# Patient Record
Sex: FEMALE | Race: WHITE | NOT HISPANIC OR LATINO | Employment: UNEMPLOYED | URBAN - METROPOLITAN AREA
[De-identification: names, ages, dates, MRNs, and addresses within clinical notes are randomized per-mention and may not be internally consistent; named-entity substitution may affect disease eponyms.]

---

## 2017-06-26 ENCOUNTER — OFFICE VISIT - HEALTHEAST (OUTPATIENT)
Dept: INTERNAL MEDICINE | Facility: CLINIC | Age: 27
End: 2017-06-26

## 2017-06-26 DIAGNOSIS — B35.4 RINGWORM OF BODY: ICD-10-CM

## 2017-06-26 ASSESSMENT — MIFFLIN-ST. JEOR: SCORE: 1437.56

## 2017-10-11 ENCOUNTER — OFFICE VISIT - HEALTHEAST (OUTPATIENT)
Dept: INTERNAL MEDICINE | Facility: CLINIC | Age: 27
End: 2017-10-11

## 2017-10-11 DIAGNOSIS — Z00.00 HEALTHCARE MAINTENANCE: ICD-10-CM

## 2017-10-11 DIAGNOSIS — F90.2 ATTENTION DEFICIT HYPERACTIVITY DISORDER (ADHD), COMBINED TYPE: ICD-10-CM

## 2017-10-11 ASSESSMENT — MIFFLIN-ST. JEOR: SCORE: 1455.7

## 2017-11-15 ENCOUNTER — OFFICE VISIT - HEALTHEAST (OUTPATIENT)
Dept: INTERNAL MEDICINE | Facility: CLINIC | Age: 27
End: 2017-11-15

## 2017-11-15 DIAGNOSIS — Z00.00 HEALTH CARE MAINTENANCE: ICD-10-CM

## 2017-11-15 DIAGNOSIS — F90.2 ATTENTION DEFICIT HYPERACTIVITY DISORDER (ADHD), COMBINED TYPE: ICD-10-CM

## 2017-11-15 LAB — HIV 1+2 AB+HIV1 P24 AG SERPL QL IA: NEGATIVE

## 2017-11-15 ASSESSMENT — MIFFLIN-ST. JEOR: SCORE: 1457.29

## 2017-12-30 ENCOUNTER — COMMUNICATION - HEALTHEAST (OUTPATIENT)
Dept: INTERNAL MEDICINE | Facility: CLINIC | Age: 27
End: 2017-12-30

## 2018-03-15 ENCOUNTER — COMMUNICATION - HEALTHEAST (OUTPATIENT)
Dept: INTERNAL MEDICINE | Facility: CLINIC | Age: 28
End: 2018-03-15

## 2018-03-16 ENCOUNTER — COMMUNICATION - HEALTHEAST (OUTPATIENT)
Dept: INTERNAL MEDICINE | Facility: CLINIC | Age: 28
End: 2018-03-16

## 2018-03-20 ENCOUNTER — COMMUNICATION - HEALTHEAST (OUTPATIENT)
Dept: INTERNAL MEDICINE | Facility: CLINIC | Age: 28
End: 2018-03-20

## 2018-05-28 ENCOUNTER — COMMUNICATION - HEALTHEAST (OUTPATIENT)
Dept: INTERNAL MEDICINE | Facility: CLINIC | Age: 28
End: 2018-05-28

## 2018-05-28 DIAGNOSIS — F90.2 ATTENTION DEFICIT HYPERACTIVITY DISORDER (ADHD), COMBINED TYPE: ICD-10-CM

## 2018-05-31 ENCOUNTER — COMMUNICATION - HEALTHEAST (OUTPATIENT)
Dept: INTERNAL MEDICINE | Facility: CLINIC | Age: 28
End: 2018-05-31

## 2018-07-26 ENCOUNTER — COMMUNICATION - HEALTHEAST (OUTPATIENT)
Dept: INTERNAL MEDICINE | Facility: CLINIC | Age: 28
End: 2018-07-26

## 2018-07-27 ENCOUNTER — RECORDS - HEALTHEAST (OUTPATIENT)
Dept: ADMINISTRATIVE | Facility: OTHER | Age: 28
End: 2018-07-27

## 2018-09-05 ENCOUNTER — COMMUNICATION - HEALTHEAST (OUTPATIENT)
Dept: INTERNAL MEDICINE | Facility: CLINIC | Age: 28
End: 2018-09-05

## 2018-09-25 ENCOUNTER — COMMUNICATION - HEALTHEAST (OUTPATIENT)
Dept: INTERNAL MEDICINE | Facility: CLINIC | Age: 28
End: 2018-09-25

## 2018-10-02 ENCOUNTER — OFFICE VISIT - HEALTHEAST (OUTPATIENT)
Dept: INTERNAL MEDICINE | Facility: CLINIC | Age: 28
End: 2018-10-02

## 2018-10-02 DIAGNOSIS — F90.2 ATTENTION DEFICIT HYPERACTIVITY DISORDER (ADHD), COMBINED TYPE: ICD-10-CM

## 2018-10-02 DIAGNOSIS — Z00.00 HEALTH CARE MAINTENANCE: ICD-10-CM

## 2018-10-02 DIAGNOSIS — R35.0 URINARY FREQUENCY: ICD-10-CM

## 2018-10-02 DIAGNOSIS — Z76.89 ENCOUNTER TO ESTABLISH CARE WITH NEW DOCTOR: ICD-10-CM

## 2018-10-02 DIAGNOSIS — N94.89 VAGINAL BURNING: ICD-10-CM

## 2018-10-02 LAB
ALBUMIN UR-MCNC: NEGATIVE MG/DL
APPEARANCE UR: CLEAR
BILIRUB UR QL STRIP: NEGATIVE
CLUE CELLS: NORMAL
COLOR UR AUTO: YELLOW
GLUCOSE UR STRIP-MCNC: NEGATIVE MG/DL
HGB UR QL STRIP: NEGATIVE
KETONES UR STRIP-MCNC: ABNORMAL MG/DL
LEUKOCYTE ESTERASE UR QL STRIP: NEGATIVE
NITRATE UR QL: NEGATIVE
PH UR STRIP: 6.5 [PH] (ref 5–8)
SP GR UR STRIP: 1.02 (ref 1–1.03)
TRICHOMONAS, WET PREP: NORMAL
UROBILINOGEN UR STRIP-ACNC: ABNORMAL
YEAST, WET PREP: NORMAL

## 2018-10-02 ASSESSMENT — MIFFLIN-ST. JEOR: SCORE: 1439.83

## 2018-11-14 ENCOUNTER — COMMUNICATION - HEALTHEAST (OUTPATIENT)
Dept: INTERNAL MEDICINE | Facility: CLINIC | Age: 28
End: 2018-11-14

## 2018-11-15 ENCOUNTER — AMBULATORY - HEALTHEAST (OUTPATIENT)
Dept: INTERNAL MEDICINE | Facility: CLINIC | Age: 28
End: 2018-11-15

## 2019-02-13 ENCOUNTER — COMMUNICATION - HEALTHEAST (OUTPATIENT)
Dept: INTERNAL MEDICINE | Facility: CLINIC | Age: 29
End: 2019-02-13

## 2019-04-18 ENCOUNTER — COMMUNICATION - HEALTHEAST (OUTPATIENT)
Dept: INTERNAL MEDICINE | Facility: CLINIC | Age: 29
End: 2019-04-18

## 2019-04-18 DIAGNOSIS — F90.2 ATTENTION DEFICIT HYPERACTIVITY DISORDER (ADHD), COMBINED TYPE: ICD-10-CM

## 2019-04-19 ENCOUNTER — COMMUNICATION - HEALTHEAST (OUTPATIENT)
Dept: INTERNAL MEDICINE | Facility: CLINIC | Age: 29
End: 2019-04-19

## 2019-06-02 ENCOUNTER — COMMUNICATION - HEALTHEAST (OUTPATIENT)
Dept: INTERNAL MEDICINE | Facility: CLINIC | Age: 29
End: 2019-06-02

## 2019-06-02 DIAGNOSIS — F90.2 ATTENTION DEFICIT HYPERACTIVITY DISORDER (ADHD), COMBINED TYPE: ICD-10-CM

## 2019-07-19 ENCOUNTER — COMMUNICATION - HEALTHEAST (OUTPATIENT)
Dept: INTERNAL MEDICINE | Facility: CLINIC | Age: 29
End: 2019-07-19

## 2019-07-19 DIAGNOSIS — F90.2 ATTENTION DEFICIT HYPERACTIVITY DISORDER (ADHD), COMBINED TYPE: ICD-10-CM

## 2019-07-31 ENCOUNTER — COMMUNICATION - HEALTHEAST (OUTPATIENT)
Dept: INTERNAL MEDICINE | Facility: CLINIC | Age: 29
End: 2019-07-31

## 2019-07-31 DIAGNOSIS — F90.2 ATTENTION DEFICIT HYPERACTIVITY DISORDER (ADHD), COMBINED TYPE: ICD-10-CM

## 2019-09-20 ENCOUNTER — OFFICE VISIT - HEALTHEAST (OUTPATIENT)
Dept: INTERNAL MEDICINE | Facility: CLINIC | Age: 29
End: 2019-09-20

## 2019-09-20 DIAGNOSIS — F90.2 ATTENTION DEFICIT HYPERACTIVITY DISORDER (ADHD), COMBINED TYPE: ICD-10-CM

## 2019-09-20 DIAGNOSIS — Z00.00 HEALTH CARE MAINTENANCE: ICD-10-CM

## 2019-09-20 DIAGNOSIS — N89.8 VAGINAL DISCHARGE: ICD-10-CM

## 2019-09-20 LAB
ANION GAP SERPL CALCULATED.3IONS-SCNC: 10 MMOL/L (ref 5–18)
BUN SERPL-MCNC: 18 MG/DL (ref 8–22)
CALCIUM SERPL-MCNC: 9.4 MG/DL (ref 8.5–10.5)
CHLORIDE BLD-SCNC: 106 MMOL/L (ref 98–107)
CHOLEST SERPL-MCNC: 166 MG/DL
CLUE CELLS: NORMAL
CO2 SERPL-SCNC: 24 MMOL/L (ref 22–31)
CREAT SERPL-MCNC: 0.8 MG/DL (ref 0.6–1.1)
ERYTHROCYTE [DISTWIDTH] IN BLOOD BY AUTOMATED COUNT: 10.1 % (ref 11–14.5)
FASTING STATUS PATIENT QL REPORTED: NO
GFR SERPL CREATININE-BSD FRML MDRD: >60 ML/MIN/1.73M2
GLUCOSE BLD-MCNC: 78 MG/DL (ref 70–125)
HCT VFR BLD AUTO: 40.2 % (ref 35–47)
HDLC SERPL-MCNC: 62 MG/DL
HGB BLD-MCNC: 13.6 G/DL (ref 12–16)
LDLC SERPL CALC-MCNC: 73 MG/DL
MCH RBC QN AUTO: 32.1 PG (ref 27–34)
MCHC RBC AUTO-ENTMCNC: 33.9 G/DL (ref 32–36)
MCV RBC AUTO: 95 FL (ref 80–100)
PLATELET # BLD AUTO: 235 THOU/UL (ref 140–440)
PMV BLD AUTO: 6.7 FL (ref 7–10)
POTASSIUM BLD-SCNC: 4 MMOL/L (ref 3.5–5)
RBC # BLD AUTO: 4.25 MILL/UL (ref 3.8–5.4)
SODIUM SERPL-SCNC: 140 MMOL/L (ref 136–145)
TRICHOMONAS, WET PREP: NORMAL
TRIGL SERPL-MCNC: 155 MG/DL
TSH SERPL DL<=0.005 MIU/L-ACNC: 4.23 UIU/ML (ref 0.3–5)
WBC: 6.4 THOU/UL (ref 4–11)
YEAST, WET PREP: NORMAL

## 2019-09-20 ASSESSMENT — MIFFLIN-ST. JEOR: SCORE: 1512.4

## 2019-09-20 ASSESSMENT — PATIENT HEALTH QUESTIONNAIRE - PHQ9: SUM OF ALL RESPONSES TO PHQ QUESTIONS 1-9: 19

## 2019-09-22 ENCOUNTER — COMMUNICATION - HEALTHEAST (OUTPATIENT)
Dept: INTERNAL MEDICINE | Facility: CLINIC | Age: 29
End: 2019-09-22

## 2019-09-23 ENCOUNTER — AMBULATORY - HEALTHEAST (OUTPATIENT)
Dept: INTERNAL MEDICINE | Facility: CLINIC | Age: 29
End: 2019-09-23

## 2019-09-23 DIAGNOSIS — Z83.49 FAMILY HISTORY OF MTHFR DEFICIENCY: ICD-10-CM

## 2019-11-05 ENCOUNTER — COMMUNICATION - HEALTHEAST (OUTPATIENT)
Dept: INTERNAL MEDICINE | Facility: CLINIC | Age: 29
End: 2019-11-05

## 2019-11-05 DIAGNOSIS — F90.2 ATTENTION DEFICIT HYPERACTIVITY DISORDER (ADHD), COMBINED TYPE: ICD-10-CM

## 2019-12-18 ENCOUNTER — COMMUNICATION - HEALTHEAST (OUTPATIENT)
Dept: INTERNAL MEDICINE | Facility: CLINIC | Age: 29
End: 2019-12-18

## 2019-12-18 DIAGNOSIS — F90.2 ATTENTION DEFICIT HYPERACTIVITY DISORDER (ADHD), COMBINED TYPE: ICD-10-CM

## 2020-02-26 ENCOUNTER — COMMUNICATION - HEALTHEAST (OUTPATIENT)
Dept: INTERNAL MEDICINE | Facility: CLINIC | Age: 30
End: 2020-02-26

## 2020-02-26 DIAGNOSIS — F90.2 ATTENTION DEFICIT HYPERACTIVITY DISORDER (ADHD), COMBINED TYPE: ICD-10-CM

## 2020-04-15 ENCOUNTER — COMMUNICATION - HEALTHEAST (OUTPATIENT)
Dept: INTERNAL MEDICINE | Facility: CLINIC | Age: 30
End: 2020-04-15

## 2020-04-15 DIAGNOSIS — F90.2 ATTENTION DEFICIT HYPERACTIVITY DISORDER (ADHD), COMBINED TYPE: ICD-10-CM

## 2020-05-20 ENCOUNTER — COMMUNICATION - HEALTHEAST (OUTPATIENT)
Dept: INTERNAL MEDICINE | Facility: CLINIC | Age: 30
End: 2020-05-20

## 2020-05-21 ENCOUNTER — COMMUNICATION - HEALTHEAST (OUTPATIENT)
Dept: INTERNAL MEDICINE | Facility: CLINIC | Age: 30
End: 2020-05-21

## 2020-05-21 ENCOUNTER — OFFICE VISIT - HEALTHEAST (OUTPATIENT)
Dept: INTERNAL MEDICINE | Facility: CLINIC | Age: 30
End: 2020-05-21

## 2020-05-21 DIAGNOSIS — F41.0 PANIC ATTACK: ICD-10-CM

## 2020-05-21 DIAGNOSIS — F41.1 GENERALIZED ANXIETY DISORDER: ICD-10-CM

## 2020-05-21 DIAGNOSIS — F90.2 ATTENTION DEFICIT HYPERACTIVITY DISORDER (ADHD), COMBINED TYPE: ICD-10-CM

## 2020-05-21 RX ORDER — FEXOFENADINE HCL 180 MG/1
180 TABLET ORAL DAILY
Status: SHIPPED | COMMUNITY
Start: 2020-05-21

## 2020-05-29 ENCOUNTER — COMMUNICATION - HEALTHEAST (OUTPATIENT)
Dept: INTERNAL MEDICINE | Facility: CLINIC | Age: 30
End: 2020-05-29

## 2020-05-29 DIAGNOSIS — F90.2 ATTENTION DEFICIT HYPERACTIVITY DISORDER (ADHD), COMBINED TYPE: ICD-10-CM

## 2020-05-30 ENCOUNTER — COMMUNICATION - HEALTHEAST (OUTPATIENT)
Dept: INTERNAL MEDICINE | Facility: CLINIC | Age: 30
End: 2020-05-30

## 2020-05-30 DIAGNOSIS — F90.2 ATTENTION DEFICIT HYPERACTIVITY DISORDER (ADHD), COMBINED TYPE: ICD-10-CM

## 2020-07-28 ENCOUNTER — COMMUNICATION - HEALTHEAST (OUTPATIENT)
Dept: INTERNAL MEDICINE | Facility: CLINIC | Age: 30
End: 2020-07-28

## 2020-07-28 DIAGNOSIS — F90.2 ATTENTION DEFICIT HYPERACTIVITY DISORDER (ADHD), COMBINED TYPE: ICD-10-CM

## 2020-08-21 ENCOUNTER — COMMUNICATION - HEALTHEAST (OUTPATIENT)
Dept: INTERNAL MEDICINE | Facility: CLINIC | Age: 30
End: 2020-08-21

## 2020-08-21 DIAGNOSIS — F90.2 ATTENTION DEFICIT HYPERACTIVITY DISORDER (ADHD), COMBINED TYPE: ICD-10-CM

## 2020-10-05 ENCOUNTER — COMMUNICATION - HEALTHEAST (OUTPATIENT)
Dept: INTERNAL MEDICINE | Facility: CLINIC | Age: 30
End: 2020-10-05

## 2020-10-05 DIAGNOSIS — F90.2 ATTENTION DEFICIT HYPERACTIVITY DISORDER (ADHD), COMBINED TYPE: ICD-10-CM

## 2020-10-06 ENCOUNTER — COMMUNICATION - HEALTHEAST (OUTPATIENT)
Dept: INTERNAL MEDICINE | Facility: CLINIC | Age: 30
End: 2020-10-06

## 2020-10-06 DIAGNOSIS — F90.2 ATTENTION DEFICIT HYPERACTIVITY DISORDER (ADHD), COMBINED TYPE: ICD-10-CM

## 2020-11-04 ENCOUNTER — COMMUNICATION - HEALTHEAST (OUTPATIENT)
Dept: INTERNAL MEDICINE | Facility: CLINIC | Age: 30
End: 2020-11-04

## 2020-11-04 DIAGNOSIS — F90.2 ATTENTION DEFICIT HYPERACTIVITY DISORDER (ADHD), COMBINED TYPE: ICD-10-CM

## 2020-12-15 ENCOUNTER — COMMUNICATION - HEALTHEAST (OUTPATIENT)
Dept: INTERNAL MEDICINE | Facility: CLINIC | Age: 30
End: 2020-12-15

## 2020-12-15 DIAGNOSIS — F90.2 ATTENTION DEFICIT HYPERACTIVITY DISORDER (ADHD), COMBINED TYPE: ICD-10-CM

## 2020-12-16 ENCOUNTER — COMMUNICATION - HEALTHEAST (OUTPATIENT)
Dept: INTERNAL MEDICINE | Facility: CLINIC | Age: 30
End: 2020-12-16

## 2020-12-16 DIAGNOSIS — F41.0 PANIC ATTACK: ICD-10-CM

## 2020-12-16 DIAGNOSIS — F41.1 GENERALIZED ANXIETY DISORDER: ICD-10-CM

## 2020-12-16 RX ORDER — CITALOPRAM HYDROBROMIDE 20 MG/1
20 TABLET ORAL DAILY
Qty: 90 TABLET | Refills: 3 | Status: SHIPPED | OUTPATIENT
Start: 2020-12-16 | End: 2022-02-11

## 2020-12-18 ENCOUNTER — COMMUNICATION - HEALTHEAST (OUTPATIENT)
Dept: INTERNAL MEDICINE | Facility: CLINIC | Age: 30
End: 2020-12-18

## 2020-12-18 DIAGNOSIS — F90.2 ATTENTION DEFICIT HYPERACTIVITY DISORDER (ADHD), COMBINED TYPE: ICD-10-CM

## 2021-01-12 ENCOUNTER — OFFICE VISIT - HEALTHEAST (OUTPATIENT)
Dept: INTERNAL MEDICINE | Facility: CLINIC | Age: 31
End: 2021-01-12

## 2021-01-12 DIAGNOSIS — Z83.49 FAMILY HISTORY OF MTHFR DEFICIENCY: ICD-10-CM

## 2021-01-12 DIAGNOSIS — Z00.00 ENCOUNTER FOR PREVENTIVE CARE: ICD-10-CM

## 2021-01-12 DIAGNOSIS — F90.2 ATTENTION DEFICIT HYPERACTIVITY DISORDER (ADHD), COMBINED TYPE: ICD-10-CM

## 2021-01-12 DIAGNOSIS — Z78.9 ALCOHOL USE: ICD-10-CM

## 2021-01-12 DIAGNOSIS — R63.5 WEIGHT GAIN: ICD-10-CM

## 2021-01-12 DIAGNOSIS — F41.1 GENERALIZED ANXIETY DISORDER: ICD-10-CM

## 2021-01-12 DIAGNOSIS — R30.0 DYSURIA: ICD-10-CM

## 2021-01-12 LAB
ALBUMIN SERPL-MCNC: 4.3 G/DL (ref 3.5–5)
ALBUMIN UR-MCNC: NEGATIVE MG/DL
ALP SERPL-CCNC: 67 U/L (ref 45–120)
ALT SERPL W P-5'-P-CCNC: 14 U/L (ref 0–45)
ANION GAP SERPL CALCULATED.3IONS-SCNC: 9 MMOL/L (ref 5–18)
APPEARANCE UR: CLEAR
AST SERPL W P-5'-P-CCNC: 15 U/L (ref 0–40)
BACTERIA #/AREA URNS HPF: ABNORMAL HPF
BILIRUB DIRECT SERPL-MCNC: 0.2 MG/DL
BILIRUB SERPL-MCNC: 0.6 MG/DL (ref 0–1)
BILIRUB UR QL STRIP: NEGATIVE
BUN SERPL-MCNC: 15 MG/DL (ref 8–22)
CALCIUM SERPL-MCNC: 9.7 MG/DL (ref 8.5–10.5)
CHLORIDE BLD-SCNC: 104 MMOL/L (ref 98–107)
CHOLEST SERPL-MCNC: 179 MG/DL
CO2 SERPL-SCNC: 25 MMOL/L (ref 22–31)
COLOR UR AUTO: YELLOW
CREAT SERPL-MCNC: 0.8 MG/DL (ref 0.6–1.1)
ERYTHROCYTE [DISTWIDTH] IN BLOOD BY AUTOMATED COUNT: 10.5 % (ref 11–14.5)
FASTING STATUS PATIENT QL REPORTED: YES
GFR SERPL CREATININE-BSD FRML MDRD: >60 ML/MIN/1.73M2
GLUCOSE BLD-MCNC: 94 MG/DL (ref 70–125)
GLUCOSE UR STRIP-MCNC: NEGATIVE MG/DL
HCT VFR BLD AUTO: 38.6 % (ref 35–47)
HDLC SERPL-MCNC: 48 MG/DL
HGB BLD-MCNC: 13.6 G/DL (ref 12–16)
HGB UR QL STRIP: NEGATIVE
KETONES UR STRIP-MCNC: NEGATIVE MG/DL
LDLC SERPL CALC-MCNC: 121 MG/DL
LEUKOCYTE ESTERASE UR QL STRIP: ABNORMAL
MCH RBC QN AUTO: 30.1 PG (ref 27–34)
MCHC RBC AUTO-ENTMCNC: 35.3 G/DL (ref 32–36)
MCV RBC AUTO: 85 FL (ref 80–100)
NITRATE UR QL: NEGATIVE
PH UR STRIP: 5.5 [PH] (ref 5–8)
PLATELET # BLD AUTO: 222 THOU/UL (ref 140–440)
PMV BLD AUTO: 7.4 FL (ref 7–10)
POTASSIUM BLD-SCNC: 5.4 MMOL/L (ref 3.5–5)
PROT SERPL-MCNC: 7.2 G/DL (ref 6–8)
RBC # BLD AUTO: 4.52 MILL/UL (ref 3.8–5.4)
RBC #/AREA URNS AUTO: ABNORMAL HPF
SODIUM SERPL-SCNC: 138 MMOL/L (ref 136–145)
SP GR UR STRIP: >=1.03 (ref 1–1.03)
SQUAMOUS #/AREA URNS AUTO: ABNORMAL LPF
TRIGL SERPL-MCNC: 50 MG/DL
TSH SERPL DL<=0.005 MIU/L-ACNC: 2.17 UIU/ML (ref 0.3–5)
UROBILINOGEN UR STRIP-ACNC: ABNORMAL
WBC #/AREA URNS AUTO: ABNORMAL HPF
WBC CLUMPS #/AREA URNS HPF: PRESENT /[HPF]
WBC: 5.7 THOU/UL (ref 4–11)

## 2021-01-12 ASSESSMENT — MIFFLIN-ST. JEOR: SCORE: 1531.97

## 2021-01-12 ASSESSMENT — PATIENT HEALTH QUESTIONNAIRE - PHQ9: SUM OF ALL RESPONSES TO PHQ QUESTIONS 1-9: 8

## 2021-01-13 LAB
BACTERIA SPEC CULT: NO GROWTH
HPV SOURCE: ABNORMAL
HUMAN PAPILLOMA VIRUS 16 DNA: POSITIVE
HUMAN PAPILLOMA VIRUS 18 DNA: NEGATIVE
HUMAN PAPILLOMA VIRUS FINAL DIAGNOSIS: ABNORMAL
HUMAN PAPILLOMA VIRUS OTHER HR: NEGATIVE
SPECIMEN DESCRIPTION: ABNORMAL

## 2021-01-14 LAB
C TRACH DNA SPEC QL PROBE+SIG AMP: NEGATIVE
FACTOR 5 LEIDEN MUTAT PCR - HISTORICAL: NORMAL
LA PPP-IMP: NEGATIVE
N GONORRHOEA DNA SPEC QL NAA+PROBE: NEGATIVE

## 2021-01-18 LAB — MOLECULAR DX INHERIT DISORDER - HISTORICAL: NORMAL

## 2021-01-19 ENCOUNTER — COMMUNICATION - HEALTHEAST (OUTPATIENT)
Dept: INTERNAL MEDICINE | Facility: CLINIC | Age: 31
End: 2021-01-19

## 2021-01-20 LAB
BKR LAB AP ABNORMAL BLEEDING: NO
BKR LAB AP BIRTH CONTROL/HORMONES: NORMAL
BKR LAB AP CERVICAL APPEARANCE: NORMAL
BKR LAB AP GYN ADEQUACY: NORMAL
BKR LAB AP GYN INTERPRETATION: NORMAL
BKR LAB AP HPV REFLEX: NORMAL
BKR LAB AP LMP: NORMAL
BKR LAB AP PATIENT STATUS: NORMAL
BKR LAB AP PREVIOUS ABNORMAL: NO
BKR LAB AP PREVIOUS NORMAL: 2017
HIGH RISK?: NO
PATH REPORT.COMMENTS IMP SPEC: NORMAL
RESULT FLAG (HE HISTORICAL CONVERSION): NORMAL

## 2021-01-21 ENCOUNTER — COMMUNICATION - HEALTHEAST (OUTPATIENT)
Dept: INTERNAL MEDICINE | Facility: CLINIC | Age: 31
End: 2021-01-21

## 2021-01-21 ENCOUNTER — AMBULATORY - HEALTHEAST (OUTPATIENT)
Dept: INTERNAL MEDICINE | Facility: CLINIC | Age: 31
End: 2021-01-21

## 2021-01-21 ENCOUNTER — COMMUNICATION - HEALTHEAST (OUTPATIENT)
Dept: OBGYN | Facility: CLINIC | Age: 31
End: 2021-01-21

## 2021-01-21 DIAGNOSIS — R87.618 PAP SMEAR ABNORMALITY OF CERVIX/HUMAN PAPILLOMAVIRUS (HPV) POSITIVE: ICD-10-CM

## 2021-01-21 DIAGNOSIS — F90.2 ATTENTION DEFICIT HYPERACTIVITY DISORDER (ADHD), COMBINED TYPE: ICD-10-CM

## 2021-01-21 DIAGNOSIS — R87.618 OTHER ABNORMAL CYTOLOGICAL FINDING OF SPECIMEN FROM CERVIX: ICD-10-CM

## 2021-02-02 ENCOUNTER — RECORDS - HEALTHEAST (OUTPATIENT)
Dept: ADMINISTRATIVE | Facility: OTHER | Age: 31
End: 2021-02-02

## 2021-02-03 ENCOUNTER — RECORDS - HEALTHEAST (OUTPATIENT)
Dept: ADMINISTRATIVE | Facility: OTHER | Age: 31
End: 2021-02-03

## 2021-02-06 ENCOUNTER — COMMUNICATION - HEALTHEAST (OUTPATIENT)
Dept: INTERNAL MEDICINE | Facility: CLINIC | Age: 31
End: 2021-02-06

## 2021-02-06 DIAGNOSIS — Z00.00 ROUTINE HEALTH MAINTENANCE: ICD-10-CM

## 2021-02-23 ENCOUNTER — COMMUNICATION - HEALTHEAST (OUTPATIENT)
Dept: INTERNAL MEDICINE | Facility: CLINIC | Age: 31
End: 2021-02-23

## 2021-02-23 DIAGNOSIS — F90.2 ATTENTION DEFICIT HYPERACTIVITY DISORDER (ADHD), COMBINED TYPE: ICD-10-CM

## 2021-03-06 ENCOUNTER — COMMUNICATION - HEALTHEAST (OUTPATIENT)
Dept: INTERNAL MEDICINE | Facility: CLINIC | Age: 31
End: 2021-03-06

## 2021-03-06 DIAGNOSIS — R30.0 DYSURIA: ICD-10-CM

## 2021-03-10 ENCOUNTER — COMMUNICATION - HEALTHEAST (OUTPATIENT)
Dept: OBGYN | Facility: CLINIC | Age: 31
End: 2021-03-10

## 2021-03-10 DIAGNOSIS — R87.810 CERVICAL HIGH RISK HPV (HUMAN PAPILLOMAVIRUS) TEST POSITIVE: ICD-10-CM

## 2021-04-02 ENCOUNTER — COMMUNICATION - HEALTHEAST (OUTPATIENT)
Dept: INTERNAL MEDICINE | Facility: CLINIC | Age: 31
End: 2021-04-02

## 2021-04-02 DIAGNOSIS — F90.2 ATTENTION DEFICIT HYPERACTIVITY DISORDER (ADHD), COMBINED TYPE: ICD-10-CM

## 2021-04-16 ENCOUNTER — COMMUNICATION - HEALTHEAST (OUTPATIENT)
Dept: INTERNAL MEDICINE | Facility: CLINIC | Age: 31
End: 2021-04-16

## 2021-04-29 ENCOUNTER — COMMUNICATION - HEALTHEAST (OUTPATIENT)
Dept: INTERNAL MEDICINE | Facility: CLINIC | Age: 31
End: 2021-04-29

## 2021-04-29 DIAGNOSIS — F90.2 ATTENTION DEFICIT HYPERACTIVITY DISORDER (ADHD), COMBINED TYPE: ICD-10-CM

## 2021-05-26 ASSESSMENT — PATIENT HEALTH QUESTIONNAIRE - PHQ9: SUM OF ALL RESPONSES TO PHQ QUESTIONS 1-9: 19

## 2021-05-27 ASSESSMENT — PATIENT HEALTH QUESTIONNAIRE - PHQ9: SUM OF ALL RESPONSES TO PHQ QUESTIONS 1-9: 8

## 2021-05-28 NOTE — TELEPHONE ENCOUNTER
Controlled Substance Refill Request  Medication:   Requested Prescriptions     Pending Prescriptions Disp Refills     dextroamphetamine-amphetamine (ADDERALL) 10 mg Tab tablet 60 tablet 0     Sig: Take 1 tablet by mouth 2 (two) times a day.     Date Last Fill: 2/13/19  Pharmacy: [facility]   Submit electronically to pharmacy  Controlled Substance Agreement on File:   Encounter-Level CSA Scan Date:    There are no encounter-level csa scan date.       Last office visit: Last office visit pertaining to requested medication was 10/2/18.

## 2021-05-29 NOTE — TELEPHONE ENCOUNTER
Controlled Substance Refill Request  Medication:   Requested Prescriptions     Pending Prescriptions Disp Refills     dextroamphetamine-amphetamine (ADDERALL) 10 mg Tab tablet 60 tablet 0     Sig: Take 1 tablet by mouth 2 (two) times a day.     Date Last Fill: 4/19/19  Pharmacy: [facility]   Submit electronically to pharmacy    Controlled Substance Agreement on File:   Encounter-Level CSA Scan Date:    There are no encounter-level csa scan date.       Last office visit with primary: 10/2/2018

## 2021-05-30 NOTE — TELEPHONE ENCOUNTER
Controlled Substance Refill Request  Medication:   Requested Prescriptions     Pending Prescriptions Disp Refills     dextroamphetamine-amphetamine (ADDERALL) 10 mg Tab tablet 60 tablet 0     Sig: Take 1 tablet by mouth 2 (two) times a day.     Date Last Fill: 6/3/19  Pharmacy: [facility]   Submit electronically to pharmacy  Controlled Substance Agreement on File:   Encounter-Level CSA Scan Date:    There are no encounter-level csa scan date.       Last office visit: Last office visit pertaining to requested medication was 10/2/18.

## 2021-05-31 VITALS — HEIGHT: 65 IN | WEIGHT: 158 LBS | BODY MASS INDEX: 26.33 KG/M2

## 2021-05-31 VITALS — HEIGHT: 64 IN | BODY MASS INDEX: 28.25 KG/M2 | WEIGHT: 165.5 LBS

## 2021-05-31 VITALS — HEIGHT: 66 IN | BODY MASS INDEX: 25.81 KG/M2 | WEIGHT: 160.6 LBS

## 2021-05-31 NOTE — TELEPHONE ENCOUNTER
[employer] message sent to the pt to schedule a follow up   Please help schedule pt for med check up

## 2021-05-31 NOTE — TELEPHONE ENCOUNTER
Controlled Substance Refill Request  Medication:   Requested Prescriptions     Pending Prescriptions Disp Refills     dextroamphetamine-amphetamine (ADDERALL) 10 mg Tab tablet 60 tablet 0     Sig: Take 1 tablet by mouth 2 (two) times a day.     Date Last Fill: 7/19/19  Pharmacy: [facility]   Submit electronically to pharmacy  Controlled Substance Agreement on File:   Encounter-Level CSA Scan Date:    There are no encounter-level csa scan date.       Last office visit: Last office visit pertaining to requested medication was 10/2/18.

## 2021-06-01 VITALS — HEIGHT: 64 IN | WEIGHT: 162 LBS | BODY MASS INDEX: 27.66 KG/M2

## 2021-06-01 NOTE — PROGRESS NOTES
[facility] Follow Up Note    Assessment/Plan:  1. Attention deficit hyperactivity disorder (ADHD), combined type  Using medications less than regularly.  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  Using the medication more during the restaurant hours.  Starting college in spring.  Recommendation: Continue with Adderall as she is doing.  She is not using it excessively.  - dextroamphetamine-amphetamine (ADDERALL) 10 mg Tab tablet; Take 1 tablet by mouth 2 (two) times a day.  Dispense: 60 tablet; Refill: 0    2. Vaginal discharge  With IUD.  Not sexually active.  Complaints of malodorous discharge.  Recommendation: Wet prep.  MetroGel-vaginal preparation.  If no improvement, could consider removal of IUD since she has had this greater than 4 years.  - Wet Prep, Vaginal  - metroNIDAZOLE (METROGEL) 0.75 % vaginal gel; Hs for 7 days  Dispense: 70 g; Refill: 0    3.  Self-report of family history of coagulation abnormality.  Sisters are on blood thinners for pregnancy.  Recommendation: Contact me with further detailed family history.  We will update labs.    Aicha Ramon MD    Chief Complaint:  Chief Complaint   Patient presents with     Medication Management       History of Present Illness:  Layne is a 28 y.o. female who is here today for medication check.  Of note, she has been on Adderall for ADD.  She has a small dose ordered to be used as needed.  She is employed as a  in a restaurant as well as a nanny.  She does not use the medication when she is working with the children.  She does not feel it is necessary.  She is, however, starting college soon.  She is hoping to complete her bachelor's degree in communications.  She states that she functions much better and is able to concentrate with her ADD meds on board.    Additional concerns include a vaginal discharge.  She notes a yellow, malodorous discharge on a daily basis.  This is been present for several months.  Of note, she is  "not sexually active.  She does not have menstrual periods with the IUD-Mirena.  This was inserted by nurse midwife [address].  She believes it is approximately 4 years old.  She denies any pelvic pain, distention, etc.  She had a normal Pap smear a year and a half ago.    Review of Systems:  A comprehensive review of systems was performed and was otherwise negative    PFSH:  Social History: She is single and living at her parents home.  She is hoping to start college for a degree in [employer].  She has 2 years under her belt.  Additionally, she works as a  and a nanny.  She is not sexually active.  Social History     Tobacco Use   Smoking Status Light Tobacco Smoker   Smokeless Tobacco Current User       Past History: No past medical history on file.    Current Outpatient Medications   Medication Sig Dispense Refill     dextroamphetamine-amphetamine (ADDERALL) 10 mg Tab tablet Take 1 tablet by mouth 2 (two) times a day. 60 tablet 0     levonorgestrel (MIRENA) 20 mcg/24 hr (5 years) IUD 1 each by Intrauterine route once. Mirena IUD Inserted by Sarah Hook CNM on 1-6-16   (Due for removal by 1-6-2021)  [employer]  Lot [id number]  Exp 04/18  NDC 88767-425-98       metroNIDAZOLE (METROGEL) 0.75 % vaginal gel Hs for 7 days 70 g 0     No current facility-administered medications for this visit.        Family History: She reports a family history of procoagulant disorder.  She states her sisters have been on shots during pregnancies to prevent blood clots.  She is going to gather more information on this history    Physical Exam:  General Appearance:   Pleasant and well-appearing and in no acute distress  Vitals:    09/20/19 0719   BP: 102/68   Patient Site: Left Arm   Patient Position: Sitting   Cuff Size: Adult Regular   Pulse: 72   SpO2: 98%   Weight: 178 lb (80.7 kg)   Height: 5' 4\" (1.626 m)     Wt Readings from Last 3 Encounters:   09/20/19 178 lb (80.7 kg)   10/02/18 162 lb (73.5 " kg)   11/15/17 160 lb 9.6 oz (72.8 kg)     Body mass index is 30.55 kg/m .    Exam performed.  External genitalia within normal limits.  A speculum is inserted.  The IUD string is noted.  There is a yellowish discharge present.  Wet prep was prepared

## 2021-06-03 VITALS
OXYGEN SATURATION: 98 % | DIASTOLIC BLOOD PRESSURE: 68 MMHG | SYSTOLIC BLOOD PRESSURE: 102 MMHG | HEIGHT: 64 IN | BODY MASS INDEX: 30.39 KG/M2 | WEIGHT: 178 LBS | HEART RATE: 72 BPM

## 2021-06-03 NOTE — TELEPHONE ENCOUNTER
Controlled Substance Refill Request  Medication:   Requested Prescriptions     Pending Prescriptions Disp Refills     dextroamphetamine-amphetamine (ADDERALL) 10 mg Tab tablet 60 tablet 0     Sig: Take 1 tablet by mouth 2 (two) times a day.     Date Last Fill: 9.20.19  Pharmacy: [facility]   Submit electronically to pharmacy  Controlled Substance Agreement on File:   Encounter-Level CSA Scan Date:    There are no encounter-level csa scan date.       Last office visit: Last office visit pertaining to requested medication was 9.20.19.

## 2021-06-04 NOTE — TELEPHONE ENCOUNTER
Controlled Substance Refill Request  Medication:   Requested Prescriptions     Pending Prescriptions Disp Refills     dextroamphetamine-amphetamine (ADDERALL) 10 mg Tab tablet 60 tablet 0     Sig: Take 1 tablet by mouth 2 (two) times a day.     Date Last Fill: 11/5/19  Pharmacy: [facility]   Submit electronically to pharmacy  Controlled Substance Agreement on File:   Encounter-Level CSA Scan Date - 10/02/2018:    Scan on 10/11/2018  9:22 AM       Last office visit: 9/20/2019 Aicha Ramon MD

## 2021-06-05 ENCOUNTER — COMMUNICATION - HEALTHEAST (OUTPATIENT)
Dept: INTERNAL MEDICINE | Facility: CLINIC | Age: 31
End: 2021-06-05

## 2021-06-05 VITALS
OXYGEN SATURATION: 98 % | DIASTOLIC BLOOD PRESSURE: 70 MMHG | HEART RATE: 86 BPM | RESPIRATION RATE: 18 BRPM | SYSTOLIC BLOOD PRESSURE: 100 MMHG | BODY MASS INDEX: 30.98 KG/M2 | HEIGHT: 64 IN | WEIGHT: 181.44 LBS

## 2021-06-05 DIAGNOSIS — F90.2 ATTENTION DEFICIT HYPERACTIVITY DISORDER (ADHD), COMBINED TYPE: ICD-10-CM

## 2021-06-06 NOTE — TELEPHONE ENCOUNTER
Controlled Substance Refill Request  Medication Name:   Requested Prescriptions     Pending Prescriptions Disp Refills     dextroamphetamine-amphetamine (ADDERALL) 10 mg Tab tablet 60 tablet 0     Sig: Take 1 tablet by mouth 2 (two) times a day.     Date Last Fill:   dextroamphetamine-amphetamine (ADDERALL) 10 mg Tab tablet 60 tablet 0 12/23/2019  No   Sig - Route: Take 1 tablet by mouth 2 (two) times a day. - Oral   Sent to pharmacy as: dextroamphetamine-amphetamine 10 mg tablet (Adderall)   Earliest Fill Date: 12/23/2019   E-Prescribing Status: Receipt confirmed by pharmacy (12/23/2019 12:03 PM CST)   Requested Pharmacy: [facility]  Submit electronically to pharmacy  Controlled Substance Agreement on file:   Encounter-Level CSA Scan Date - 10/02/2018:    Scan on 10/11/2018  9:22 AM        Last office visit:  9/20/19

## 2021-06-07 RX ORDER — DEXTROAMPHETAMINE SACCHARATE, AMPHETAMINE ASPARTATE, DEXTROAMPHETAMINE SULFATE AND AMPHETAMINE SULFATE 2.5; 2.5; 2.5; 2.5 MG/1; MG/1; MG/1; MG/1
10 TABLET ORAL 2 TIMES DAILY
Qty: 60 TABLET | Refills: 0 | Status: SHIPPED | OUTPATIENT
Start: 2021-06-07 | End: 2021-08-02

## 2021-06-07 NOTE — TELEPHONE ENCOUNTER
Controlled Substance Refill Request  Medication Name:   Requested Prescriptions     Pending Prescriptions Disp Refills     dextroamphetamine-amphetamine (ADDERALL) 10 mg Tab tablet 60 tablet 0     Sig: Take 1 tablet by mouth 2 (two) times a day.     Date Last Fill: 2/27/20  [facility]ed Pharmacy: [facility]  Submit electronically to pharmacy  Controlled Substance Agreement on file:   Encounter-Level CSA Scan Date - 10/02/2018:    Scan on 10/11/2018  9:22 AM        Last office visit:  9/20/19

## 2021-06-08 NOTE — TELEPHONE ENCOUNTER
Controlled Substance Refill Request  Medication Name:   Requested Prescriptions     Pending Prescriptions Disp Refills     dextroamphetamine-amphetamine (ADDERALL) 10 mg Tab tablet [Pharmacy Med Name: AMPHETAMINE SALTS 10 MG TAB 10 TAB] 60 tablet 0     Sig: TAKE 1 TABLET BY MOUTH TWO TIMES A DAY.     Date Last Fill: 4/15/20  [facility]ed [facility]: [facility]  Submit electronically to pharmacy  Controlled Substance Agreement on file:   Encounter-Level CSA Scan Date - 10/02/2018:    Scan on 10/11/2018  9:22 AM        Last office visit:  5/21/20

## 2021-06-08 NOTE — TELEPHONE ENCOUNTER
Patient Returning Call  Reason for call:  PATIENT CALLING BACK    Information relayed to patient: Patient picked up an started RX  5/21/19     (10 MG, patient reports its working ok with  no side effects)  she does think dose could be increased to 20 mg, Please send new RX for 20 mg if MD agree's  Patient has additional questions:  Yes  If YES, what are your questions/concerns:  Medication working, please send 20 mg to pharmacy   Okay to leave a detailed message?: Yes

## 2021-06-08 NOTE — TELEPHONE ENCOUNTER
Medication: Adderall  Last Date Filled 4/17/20   pulled: YES    Only PCP Prescribing? : YES  Taken as prescribed from physician notes? YES    CSA in last year: YES  Random Utox in last year: NO  (if any of the above answer NO - schedule with PCP)     Opioids + benzodiazepines? YES  (if the above answer YES - schedule with PCP every 6 months)     Is patient on the Executive Review Team? no      All responses suggest: Refilling prescription

## 2021-06-08 NOTE — PROGRESS NOTES
"Layne Tariq is a 29 y.o. female who is being evaluated via a billable video visit.      The patient has been notified of following:     \"This video visit will be conducted via a call between you and your physician/provider. We have found that certain health care needs can be provided without the need for an in-person physical exam.  This service lets us provide the care you need with a video conversation.  If a prescription is necessary we can send it directly to your pharmacy.  If lab work is needed we can place an order for that and you can then stop by our lab to have the test done at a later time.    Video visits are billed at different rates depending on your insurance coverage. Please reach out to your insurance provider with any questions.    If during the course of the call the physician/provider feels a video visit is not appropriate, you will not be charged for this service.\"    Patient has given verbal consent to a Video visit? Yes    Patient would like to receive their AVS by AVS Preference: Alejandro.    Patient would like the video invitation sent by: Text to cell phone: [phone]    Will anyone else be joining your video visit? Yes- mother Liz may join         Video Start Time: 10:25 AM    Additional provider notes:   [facility] Follow Up Note    Assessment/Plan:    1. Generalized anxiety disorder  History of generalized anxiety disorder-treated with lifestyle measures.  Now escalated due to coronavirus quarantine/furloughed from jobs-etc.  Lengthy discussion was had today in the presence of her mother Liz who is an occupational therapist.  Recommendation: We will proceed with citalopram at 1 tablet q. evening.  If after 2 weeks, she is tolerating well but has not had significant amelioration of symptoms, will increase to 2 tabs.  We will follow-up in 4 weeks.  - citalopram (CELEXA) 10 MG tablet; Begin with one tablet in the evening.  If after 2 weeks, no side effects, " then increase to two tabs  Dispense: 60 tablet; Refill: 2    2. Panic attack  As above.  Episodic chest pain with palpitations and decreased ability to sleep in the presence of her parents.  Oxygen level, blood pressure and glucose normal.  Recommendation: As above, will begin medications.  Encourage regular exercise and healthy diet  - citalopram (CELEXA) 10 MG tablet; Begin with one tablet in the evening.  If after 2 weeks, no side effects, then increase to two tabs  Dispense: 60 tablet; Refill: 2    3. Attention deficit hyperactivity disorder (ADHD), combined type  She is using her Adderall as needed.  She recognizes that it contributes to urinary frequency as well as anxiety.  She is currently unemployed.  Therefore only using it with specific projects.      Follow-up in 3 to 4 weeks    Aicha Ramon MD    Chief Complaint:  No chief complaint on file.      History of Present Illness:  Layne is a 29 y.o. female who is on a video visit today with her mother for discussion of generalized anxiety.  She states that she knows that she has had anxiety in the past and has been electing to use medications.  She is wondering if it is time.    She describes an event where she had an episode of chest discomfort.  It occurred after overeating.  She states with this, she felt very anxious.  She felt some chest discomfort, sweatiness, nausea.  She felt as though she could not catch her breath.  Her father had a glucometer and checked both the blood pressure and glucose level.  These things were normal.  Her mother who is an occupational therapist did some neck massage.  Things seem to settle.  She describes an overwhelming sense of unsettled nests.  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  She states all of this uncertainty is somewhat frustrating.  She does state that she has a difficult time shutting her brain down at night.  She has some insomnia.  She has been eating poorly and  gaining weight.    She does describe some depression but is not suicidal.    Lifestyle is reviewed.  She is living with her parents.  Her mom and dad are very good cooks but there are lots of snacks around the home.  She has been eating a lot and gaining weight.  She states she is signed up for a plant-based program and will be having preselected meals delivered.  She is exercising twice a day.    Review of Systems:  A comprehensive review of systems was performed and was otherwise negative    PFSH:  Social History: She is single.  She is currently residing with her parents.  She was formerly employed as it [employer]  Social History     Tobacco Use   Smoking Status Light Tobacco Smoker   Smokeless Tobacco Current User       Past History: ADD and some anxiety in the past  Current Outpatient Medications   Medication Sig Dispense Refill     fexofenadine (ALLEGRA) 180 MG tablet Take 180 mg by mouth daily.       citalopram (CELEXA) 10 MG tablet Begin with one tablet in the evening.  If after 2 weeks, no side effects, then increase to two tabs 60 tablet 2     dextroamphetamine-amphetamine (ADDERALL) 10 mg Tab tablet Take 1 tablet by mouth 2 (two) times a day. 60 tablet 0     levonorgestrel (MIRENA) 20 mcg/24 hr (5 years) IUD 1 each by Intrauterine route once. Mirena IUD Inserted by Sarah Hook CNM on 1-6-16   (Due for removal by 1-6-2021)  [employer]  Lot [id number]  Exp 04/18  NDC 31854-979-05       No current facility-administered medications for this visit.        Family History: There is a family history of anxiety    Physical Exam:  General Appearance:   She appears tearful with conversation.  However, her mom is with her and clearly supportive.  There were no vitals filed for this visit.  Wt Readings from Last 3 Encounters:   09/20/19 178 lb (80.7 kg)   10/02/18 162 lb (73.5 kg)   11/15/17 160 lb 9.6 oz (72.8 kg)     There is no height or weight on file to calculate BMI.        Data  Review:    Analysis and Summary of Old Records (2): Reviewed old notes    Records Requested (1):       Other History Summarized (from other people in the room) (2): Mother contributes to the history    Radiology Tests Summarized (XRAY/CT/MRI/DXA) (1):     Labs Reviewed (1):     Medicine Tests Reviewed (EKG/ECHO/COLONOSCOPY/EGD) (1):     Independent Review of EKG or X-RAY (2):             Video-Visit Details    Type of service:  Video Visit    Video End Time (time video stopped): 10:48 AM  Originating Location (pt. Location): Home    Distant Location (provider location):  [facility] INTERNAL MEDICINE     Platform used for Video Visit: Trang Ramon MD

## 2021-06-10 NOTE — TELEPHONE ENCOUNTER
Controlled Substance Refill Request  Medication Name:   Requested Prescriptions     Pending Prescriptions Disp Refills     dextroamphetamine-amphetamine (ADDERALL) 10 mg Tab tablet 60 tablet 0     Sig: Take 1 tablet by mouth 2 (two) times a day.     Date Last Fill: 6/2/20  [facility]ed [facility]acy: [facility]  Submit electronically to pharmacy  Controlled Substance Agreement on file:   Encounter-Level CSA Scan Date - 10/02/2018:    Scan on 10/11/2018  9:22 AM        Last office visit:  5/21/20

## 2021-06-11 NOTE — PROGRESS NOTES
"[facility] Follow Up Note    Layne Tariq   26 y.o. female    Date of Visit: 6/26/2017    Chief Complaint   Patient presents with     Insect Bite     right arm     Subjective  Layne comes in today as she has a bite/spot on her right upper arm.  She has had it for about a week and it itches.  She works at a  and also is a .  She has not tried anything to make it go away.  She denies any other concerns.    ROS A comprehensive review of systems was performed and was otherwise negative    Social History:   Social History     Social History Narrative    She is a  and works at a .       Medications were reconciled.  Allergies, social and family history, and the problem list were all reviewed and updated.    Old records reviewed: Previous physical records    Exam  General Appearance: Pleasant and alert  Vitals:    06/26/17 1425   BP: 112/70   Pulse: 80   Resp: (!) 99   Weight: 158 lb (71.7 kg)   Height: 5' 5\" (1.651 m)      Body mass index is 26.29 kg/(m^2).  Wt Readings from Last 3 Encounters:   06/26/17 158 lb (71.7 kg)   11/08/16 156 lb (70.8 kg)   01/22/16 150 lb (68 kg)     HEENT: Sclera are clear.   Lungs: Normal respirations  Abdomen: Soft and nondistended  Extremities: No edema  Skin: Circular rash with outer ring darker, raised and about the size of a quarter some slight scale to it.  Neuro: Moves all extremities and has facial symmetry  Gait: Ambulates with a normal gait    Assessment/Plan  1. Ringworm of body  She was started on clotrimazole betamethasone cream to apply 2-3 times a day.  If it is not improving later this week she is told to let me know and would switch her to plain ketoconazole.          Lora Thayer MD  6/26/2017    Much or all of the text in this note was generated through the use of Dragon Dictate voice-to-text software. Errors in spelling or words which seem out of context are unintentional. Sound alike errors, in particular, may " have escaped editing.

## 2021-06-12 NOTE — TELEPHONE ENCOUNTER
Controlled Substance Refill Request  Medication Name:   Requested Prescriptions     Pending Prescriptions Disp Refills     dextroamphetamine-amphetamine (ADDERALL) 10 mg Tab tablet 60 tablet 0     Sig: Take 1 tablet by mouth 2 (two) times a day.     Date Last Fill: 10/7/20  [facility]ed [facility]: [facility]  Submit electronically to pharmacy  Controlled Substance Agreement on file:   Encounter-Level CSA Scan Date - 10/02/2018:    Scan on 10/11/2018  9:22 AM        Last office visit:  5/21/20

## 2021-06-13 NOTE — PROGRESS NOTES
"[facility] Follow Up Note    Layne Tariq   26 y.o. female    Date of Visit: 10/11/2017    Chief Complaint   Patient presents with     Discuss ADHD     Subjective  Layne comes in today to discuss ADHD.  She was seen at [facility] and had an extensive workup and they diagnosed ADHD, combined type.  They felt she would benefit from medications.  2 of her sisters also have ADHD and her on Adderall and tolerated quite well.  She is contemplating going back to school soon and would like to get started on medication.    ROS A comprehensive review of systems was performed and was otherwise negative    Social History:   Social History     Social History Narrative    She is a  and works at a .       Medications were reconciled.  Allergies, social and family history, and the problem list were all reviewed and updated.    Old records reviewed: Please see above.    Exam  General Appearance: Pleasant and alert  Vitals:    10/11/17 1155   BP: 102/60   Pulse: 72   SpO2: 95%   Weight: 165 lb 8 oz (75.1 kg)   Height: 5' 4\" (1.626 m)      Body mass index is 28.41 kg/(m^2).  Wt Readings from Last 3 Encounters:   10/11/17 165 lb 8 oz (75.1 kg)   06/26/17 158 lb (71.7 kg)   11/08/16 156 lb (70.8 kg)     HEENT: Sclera are clear.   Lungs: Normal respirations  Abdomen: Soft and nondistended  Extremities: No edema  Skin: No rashes  Neuro: Moves all extremities and has facial symmetry  Gait: Ambulates with a normal gait    Assessment/Plan  1. Attention deficit hyperactivity disorder (ADHD), combined type  We will get her started on Adderall X are 20 mg daily.  She is can a follow-up with me in 1 month.  Once we find the appropriate dose, will have her sign a CSA chooses to continue with it.    2. Healthcare maintenance  She is due for a physical, encouraged her to return to clinic in 1 month for a physical and to go over the above.  - Influenza, Seasonal,Quad Inj, 36+ MOS          April D " MD Jeovany  10/11/2017    Much or all of the text in this note was generated through the use of Dragon Dictate voice-to-text software. Errors in spelling or words which seem out of context are unintentional. Sound alike errors, in particular, may have escaped editing.

## 2021-06-13 NOTE — TELEPHONE ENCOUNTER
Prescription Monitoring Program activity reviewed with no discrepancies noted.      Last fill per : 11/12/20  Quantity/days supply: 60 tablets for 30 days     Controlled Substance Agreement on file: Yes  Date: 9/20/19    Last office visit with provider:  9/20/2019, 5/21/20 Aicha Ramon MD    Please advise.

## 2021-06-13 NOTE — TELEPHONE ENCOUNTER
Controlled Substance Refill Request  Medication Name:   Requested Prescriptions     Pending Prescriptions Disp Refills     dextroamphetamine-amphetamine (ADDERALL) 10 mg Tab tablet 60 tablet 0     Sig: Take 1 tablet by mouth 2 (two) times a day.     Date Last Fill: 11/6/20  Requested Pharmacy: [facility]  Submit electronically to pharmacy  Controlled Substance Agreement on file:   Encounter-Level CSA Scan Date - 10/02/2018:    Scan on 10/11/2018  9:22 AM        Last office visit:  5/21/20

## 2021-06-14 NOTE — TELEPHONE ENCOUNTER
----- Message from Aicha Ramon MD sent at 1/21/2021  9:36 AM CST -----  Copy pap report and HPV to [facility]  ----- Message -----  From: Hoa Salter RN  Sent: 1/21/2021   9:31 AM CST  To: Aicha Ramon MD    Will call patient to discuss. Unfortunately the pap nurses do not have faxing ability as we work remotely. If records are requested, please have your team send them. Thank you!    Hoa Salter RN BSN, Pap Tracking

## 2021-06-14 NOTE — PROGRESS NOTES
Assessment and Plan:     1. Encounter for preventive care  30-year-old healthy female here for preventative care.  Due for an update on Pap smear-new partner.  IUD-Mirena in place.  Due for replacement next year-via FDA guidelines that they are good for 5 years.  Recommendations: We will update labs as noted below.  Encourage regular exercise.  - Gynecologic Cytology (PAP Smear)  - Basic Metabolic Panel  - Hepatic Profile  - HM2(CBC w/o Differential)  - Lipid Cascade FASTING  - Urinalysis-UC if Indicated  - Chlamydia trachomatis & Neisseria gonorrhoeae, Amplified Detection    2. Generalized anxiety disorder  Table on current dose of citalopram, will continue the same    3. Attention deficit hyperactivity disorder (ADHD), combined type  Using small dose Adderall for college-3 classes this semester.  Recommendation: We will continue the same    4. Alcohol use  Some difficulties during pandemic with alcohol misuse.  Now 4 months sober.  Has a support group.  Recommendations: Continue to monitor    5. Family history of MTHFR deficiency  Update coagulation labs.  2 sisters pregnant and on anticoagulants  - MTHFR Genotype  - Factor V Leiden  - Lupus Anticoagulant    6. Dysuria  Update lab  - Urinalysis-UC if Indicated    7. Weight gain  We will check thyroid-encourage exercise  - Thyroid Cascade            Aicha Ramon MD  1/12/2021    Chief Complaint:  Chief Complaint   Patient presents with     Annual Exam       History of Present Illness:  Layne is a 30 y.o. female who is here today for her annual checkup.  Of note, she is a delightful 30-year-old female who is going back to college.  She is bothered that she is not completing her degree.  She is taking 3 courses this semester.  She is employed as a nanny as well.  She does report that she had some difficulty with alcohol use during the pandemic.  She states she was doing shots and drinking excessively.  She has managed to see that this is not healthy for her.   "She has not had any alcohol and has been abstinent for greater than 4 months.  She states that she has a support group and has joined [employer].  She states that she noted that many of her symptoms of anxiety and depression have been dissipated as she has become sober.  She is starting to feel better overall.  Concentration is better.  She is progressing in her life.    Lifestyle is reviewed.  She is not exercising regularly.  She is concerned regarding some of the weight that she has gained.    Gynecologic history: She has a Mirena IUD for 5 years.  She is sexually active and has a new partner.  This is a friend that she has been with intermittently over the past year.  She does not use condoms.  He has had heavy periods in the past.  Her Mirena has helped.        Review of Systems:    The rest of the review of systems are negative for all systems.    PFSH:  Social History: She is single.  She is currently living with her parents.  She is in college and working as a [employer]  Social History     Tobacco Use   Smoking Status Light Tobacco Smoker   Smokeless Tobacco Current User       Past Medical History:   No Known Allergies    Family History: Sisters have coagulation abnormality.  Family History   Problem Relation Age of Onset     Cancer Father      Diabetes Father      Heart disease Father      Dementia Maternal Grandmother      Cancer Maternal Grandfather      Anxiety disorder Brother      Deep vein thrombosis Sister        Physical Exam:  Vitals:    01/12/21 0825   BP: 100/70   Patient Site: Left Arm   Patient Position: Sitting   Cuff Size: Adult Regular   Pulse: 86   Resp: 18   SpO2: 98%   Weight: 181 lb 7 oz (82.3 kg)   Height: 5' 4.25\" (1.632 m)     Wt Readings from Last 3 Encounters:   01/12/21 181 lb 7 oz (82.3 kg)   09/20/19 178 lb (80.7 kg)   10/02/18 162 lb (73.5 kg)       General Appearance:  Alert, cooperative, no distress, appears stated age   Head:  Normocephalic, without obvious abnormality, atraumatic "   Eyes:  PERRL, conjunctiva/corneas clear, EOM's intact   Ears:  Normal TM's and external ear canals, both ears   Nose:    Throat:    Neck: Supple, symmetrical, trachea midline, no adenopathy;  thyroid: not enlarged, symmetric, no tenderness/mass/nodules; no carotid bruit or JVD   Back:   Symmetric, no curvature, ROM normal, no CVA tenderness   Lungs:   Clear to auscultation bilaterally, respirations unlabored   Breasts:  No breast masses, tenderness, asymmetry, or nipple discharge.   Heart:  Regular rate and rhythm, S1 and S2 normal, no murmur, rub, or gallop   Abdomen:   Soft, non-tender, bowel sounds active all four quadrants,  no masses, no organomegaly   Genitalia: Normally developed genitalia with no external lesions or eruptions.  Vagina and cervix show no lesions, inflammation, discharge or tenderness.  No cystocele.  Uterus normal size and position.  No adnexal mass or tenderness.   Rectal:   Deferred   Extremities: Extremities normal, atraumatic, no cyanosis or edema   Skin: Skin color, texture, turgor normal, no rashes or lesions   Lymph nodes: Cervical, supraclavicular, and axillary nodes normal   Neurologic: Normal, CN 2-12 intact     Medications:  Current Outpatient Medications   Medication Sig Dispense Refill     citalopram (CELEXA) 20 MG tablet Take 1 tablet (20 mg total) by mouth daily. 90 tablet 3     dextroamphetamine-amphetamine (ADDERALL) 10 mg Tab tablet Take 1 tablet by mouth 2 (two) times a day. 60 tablet 0     fexofenadine (ALLEGRA) 180 MG tablet Take 180 mg by mouth daily.       levonorgestrel (MIRENA) 20 mcg/24 hr (5 years) IUD 1 each by Intrauterine route once. Mirena IUD Inserted by Sarah Hook CNM on 1-6-16   (Due for removal by 1-6-2021)  [employer]  Lot [id number]  Exp 04/18  NDC 38810-037-15       No current facility-administered medications for this visit.        Immunizations:  Immunization History   Administered Date(s) Administered     DTaP, historic  01/10/1991, 03/07/1991, 05/23/1991, 05/12/1992, 01/10/1996     Hep A, Adult IM (19yr & older) 12/22/2015     Hep A, historic 05/30/2008, 08/25/2009     Hep B, historic 08/14/2003, 05/30/2008, 09/03/2008     HiB, historic,unspecified 01/10/1991, 03/07/1991, 05/23/1991, 05/12/1992     INFLUENZA,SEASONAL QUAD, PF, =/> 6months 11/08/2016, 09/20/2019     IPV 01/10/1991, 03/07/1991, 05/12/1992, 01/10/1996     Influenza,seasonal,quad inj =/> 6months 10/11/2017, 10/02/2018     MMR 05/12/1998, 08/14/2003     Meningococcal MCV4 Conjugate,Unspecified 08/25/2009     Td, adult adsorbed, PF 09/20/2019     Tdap 08/14/2003, 08/25/2009     Typhoid, Inj, Inactive 12/22/2015

## 2021-06-14 NOTE — TELEPHONE ENCOUNTER
Layne, your testing reveals that you are heterozygous for the MTHFR gene.  This means that you have one copy of the gene but not both.  To have a problem that puts you at increased risk for blood clots, you need to have both copies of the gene or be homozygous.  This is not the case for you.    If you would have further worry, I would be happy to refer you to a genetic counselor.  I will send a copy of your test results via the mail.    Let me know if you have questions.

## 2021-06-14 NOTE — TELEPHONE ENCOUNTER
Controlled Substance Refill Request  Medication Name:   Requested Prescriptions     Pending Prescriptions Disp Refills     dextroamphetamine-amphetamine (ADDERALL) 10 mg Tab tablet 60 tablet 0     Sig: Take 1 tablet by mouth 2 (two) times a day.     Date Last Fill: 12/16/20  Requested Pharmacy: [facility]  Submit electronically to pharmacy  Controlled Substance Agreement on file:   Encounter-Level CSA Scan Date - 10/02/2018:    Scan on 10/11/2018  9:22 AM        Last office visit:  1/12/21

## 2021-06-14 NOTE — TELEPHONE ENCOUNTER
Patient called on Friday evening and left a message stating [facility] has not received referral for colposcopy. Please resend referral as patient cannot schedule her appt until the clinic receives this. Thank you!    Hoa Salter RN BSN, Pap Tracking

## 2021-06-14 NOTE — TELEPHONE ENCOUNTER
Spoke with pt and relayed pcp message.  Pt understanding.  Pt also stated she saw on mychart she was positive for HPV. Pap smear still in process.  Will route to pcp to advise further.

## 2021-06-14 NOTE — TELEPHONE ENCOUNTER
Prescription Monitoring Program activity reviewed with no discrepancies noted.      Last fill per : 11/12/20  Quantity/days supply: 60 tabs x 30 days     Controlled Substance Agreement on file: Yes  Date: 9/20/19    Last office visit with provider:  1/12/21    Please advise.

## 2021-06-14 NOTE — PROGRESS NOTES
"Chief complaint: Here for a physical    History of present illness:   Layne comes in today for general physical.  She is having a rough day as her best friend  yesterday of cystic fibrosis after she contracted pneumonia after her second lung transplant.  She is feeling well otherwise.  She is doing fairly well on Adderall.    Social history:   Social History     Social History Narrative    She is a  and does  for her sister.  She lives with her parents.       Family history:    Family History   Problem Relation Age of Onset     Cancer Father      Diabetes Father      Dementia Maternal Grandmother      Cancer Maternal Grandfather        Review of systems: See above.  She does not get menstrual cycles due to Mirena IUD.  The rest of the review of systems are negative for all systems.    Current allergies, medications, and problem list are all reviewed and updated in the chart.    Physical exam:  Vitals:    11/15/17 1213   BP: 110/74   Patient Site: Left Arm   Patient Position: Sitting   Cuff Size: Adult Regular   Pulse: 63   Weight: 160 lb 9.6 oz (72.8 kg)   Height: 5' 5.5\" (1.664 m)     Body mass index is 26.32 kg/(m^2).  General Appearance:  Alert, cooperative, no distress, appears stated age   Head:  Normocephalic, without obvious abnormality, atraumatic   Eyes:  PERRL, conjunctiva/corneas clear, EOM's intact   Ears:  Normal TM's and external ear canals, both ears   Nose: Nares normal, no drainage    Throat: Lips, mucosa, and tongue normal; teeth and gums normal   Neck: Supple, symmetrical, trachea midline, no adenopathy;  thyroid: not enlarged, symmetric, no tenderness/mass/nodules; no carotid bruit   Back:   Symmetric, no curvature, ROM normal   Lungs:   Clear to auscultation bilaterally, respirations unlabored   Breasts:  No breast masses, tenderness, asymmetry, or nipple discharge.   Heart:  Regular rate and rhythm, S1 and S2 normal, no murmur, rub, or gallop   Abdomen:   Soft, non-tender, " bowel sounds active, no masses, no organomegaly   Genitalia: Normally developed genitalia with no external lesions or eruptions.  Vagina and cervix show no lesions, inflammation, discharge or tenderness.  No cystocele.  Uterus normal size and position.  No adnexal mass or tenderness.   Rectal:  No hemorrhoids   Extremities: Extremities normal, atraumatic, no cyanosis or edema   Skin: Skin color, texture, turgor normal, no rashes or lesions   Lymph nodes: Cervical, supraclavicular, and axillary nodes normal   Neurologic: Nonfocal with facial symmetry      Assessment and plan:  1. Health care maintenance  Pap smear is done today.  - Gynecologic Cytology (PAP Smear)  - Chlamydia trachomatis & Neisseria gonorrhoeae, Amplified Detection  - HIV Antigen/Antibody Screening [place]    2. Attention deficit hyperactivity disorder (ADHD), combined type  Adderall is refilled.            Lora Thayer MD  Internal and Geriatric Medicine  [facility]  11/15/2017    [facility]  Much or all of the text in this note was generated through the use of Dragon Dictate voice-to-text software. Errors in spelling or words which seem out of context are unintentional.  Sound alike errors, in particular, may have escaped editing.

## 2021-06-15 NOTE — TELEPHONE ENCOUNTER
Controlled Substance Refill Request  Medication Name:   Requested Prescriptions     Pending Prescriptions Disp Refills     dextroamphetamine-amphetamine (ADDERALL) 10 mg Tab tablet 60 tablet 0     Sig: Take 1 tablet by mouth 2 (two) times a day.     Date Last Fill: 1/22/21  Requested Pharmacy: [facility]  Submit electronically to pharmacy  Controlled Substance Agreement on file:   Encounter-Level CSA Scan Date - 10/02/2018:    Scan on 10/11/2018  9:22 AM        Last office visit:  1/12/21

## 2021-06-15 NOTE — TELEPHONE ENCOUNTER
Prescription Monitoring Program activity reviewed with no discrepancies noted.      Last fill per : 1/23/20  Quantity/days supply: 60 tabs x 30 days     Controlled Substance Agreement on file: Yes  Date: 9/12/20    Last office visit with provider:  1/12/2021    Please advise.     Detail Level: Detailed

## 2021-06-16 PROBLEM — F10.90 ALCOHOL USE: Status: ACTIVE | Noted: 2021-01-12

## 2021-06-16 PROBLEM — Z78.9 ALCOHOL USE: Status: ACTIVE | Noted: 2021-01-12

## 2021-06-16 PROBLEM — F90.2 ATTENTION DEFICIT HYPERACTIVITY DISORDER (ADHD), COMBINED TYPE: Status: ACTIVE | Noted: 2017-10-11

## 2021-06-16 NOTE — PROGRESS NOTES
4/12/21 No records received/[place] not done. Tracking updated for 6 mo colp/pap due 7/12/21.

## 2021-06-16 NOTE — TELEPHONE ENCOUNTER
Telephone Encounter by Kimberly Moore LPN at 2/13/2019  2:33 PM     Author: Kimberly Moore LPN Service: -- Author Type: Licensed Nurse    Filed: 2/13/2019  2:37 PM Encounter Date: 2/13/2019 Status: Signed    : Kimberly Moore LPN (Licensed Nurse)       Medication: Adderall  Last Date Filled 11/29/18   pulled: YES       Only PCP Prescribing? : YES  Taken as prescribed from physician notes? YES See Earlier Media message 11/14/18    CSA in last year: YES  Random Utox in last year: NO  (if any of the above answer NO - schedule with PCP)     Opioids + benzodiazepines? NO  (if the above answer YES - schedule with PCP every 6 months)     All responses suggest: Refilling prescription

## 2021-06-16 NOTE — TELEPHONE ENCOUNTER
Controlled Substance Refill Request  Medication Name:   Requested Prescriptions     Pending Prescriptions Disp Refills     dextroamphetamine-amphetamine (ADDERALL) 10 mg Tab tablet 60 tablet 0     Sig: Take 1 tablet by mouth 2 (two) times a day.     Date Last Fill: 2/24/21  Requested Pharmacy: [facility]  Submit electronically to pharmacy  Controlled Substance Agreement on file:   Encounter-Level CSA Scan Date - 10/02/2018:    Scan on 10/11/2018  9:22 AM        Last office visit:  1/12/21

## 2021-06-16 NOTE — TELEPHONE ENCOUNTER
Telephone Encounter by Kimberly Moore LPN at 11/5/2019  3:45 PM     Author: Kimberly Moore LPN Service: -- Author Type: Licensed Nurse    Filed: 11/5/2019  3:48 PM Encounter Date: 11/5/2019 Status: Signed    : Kimberly Moore LPN (Licensed Nurse)       Medication: Adderall  Last Date Filled 9/20/19   pulled: YES         Only PCP Prescribing? : YES  Taken as prescribed from physician notes? YES OV 9/20/19    CSA in last year: YES  Random Utox in last year: NO  (if any of the above answer NO - schedule with PCP)     Opioids + benzodiazepines? NO  (if the above answer YES - schedule with PCP every 6 months)     Is patient on the Executive Review Team? no      All responses suggest: Refilling prescription

## 2021-06-16 NOTE — TELEPHONE ENCOUNTER
Telephone Encounter by Tracie Milton CMA at 6/3/2019 12:17 PM     Author: Tracie Milton CMA Service: -- Author Type: Certified Medical Assistant    Filed: 6/3/2019 12:26 PM Encounter Date: 6/2/2019 Status: Signed    : Tracie Milton CMA (Certified Medical Assistant)       Medication: Adderall   Last Date Filled 5/2/19   pulled: YES    Only PCP Prescribing? : YES  Taken as prescribed from physician notes? YES    CSA in last year: YES  Random Utox in last year: Not needed  (if any of the above answer NO - schedule with PCP)     Opioids + benzodiazepines? NO  (if the above answer YES - schedule with PCP every 6 months)     Is patient on the Executive Review Team? No      All responses suggest: Refilling prescription

## 2021-06-16 NOTE — TELEPHONE ENCOUNTER
Telephone Encounter by Tracie Milton CMA at 12/23/2019  8:56 AM     Author: Tracie Milton CMA Service: -- Author Type: Certified Medical Assistant    Filed: 12/23/2019  8:58 AM Encounter Date: 12/18/2019 Status: Signed    : Tracie Milton CMA (Certified Medical Assistant)       Medication: Adderall  Last Date Filled 11/5/19   pulled: YES    Only PCP Prescribing? : YES  Taken as prescribed from physician notes? YES    CSA in last year: YES  Random Utox in last year: Not needed  (if any of the above answer NO - schedule with PCP)     Opioids + benzodiazepines? NO  (if the above answer YES - schedule with PCP every 6 months)     Is patient on the Executive Review Team? NO      All responses suggest: Refilling prescription

## 2021-06-16 NOTE — TELEPHONE ENCOUNTER
Telephone Encounter by Tracie Milton CMA at 4/19/2019 12:17 PM     Author: Tracie Milton CMA Service: -- Author Type: Certified Medical Assistant    Filed: 4/19/2019 12:27 PM Encounter Date: 4/18/2019 Status: Signed    : Tracie Milton CMA (Certified Medical Assistant)       Medication: Adderall   Last Date Filled 2/19/19   pulled: YES    Only PCP Prescribing? : YES  Taken as prescribed from physician notes? YES    CSA in last year: YES  Random Utox in last year: Not needed  (if any of the above answer NO - schedule with PCP)     Opioids + benzodiazepines? NO  (if the above answer YES - schedule with PCP every 6 months)     Is patient on the Executive Review Team? No      All responses suggest: Refilling prescription

## 2021-06-16 NOTE — TELEPHONE ENCOUNTER
Telephone Encounter by Tracie Milton CMA at 7/19/2019  1:41 PM     Author: Tracie Milton CMA Service: -- Author Type: Certified Medical Assistant    Filed: 7/19/2019  1:43 PM Encounter Date: 7/19/2019 Status: Signed    : Tracie Milton CMA (Certified Medical Assistant)       Medication: Adderall   Last Date Filled 6/3/19   pulled: YES    Only PCP Prescribing? : YES  Taken as prescribed from physician notes? YES    CSA in last year: YES  Random Utox in last year: Not needed  (if any of the above answer NO - schedule with PCP)     Opioids + benzodiazepines? NO  (if the above answer YES - schedule with PCP every 6 months)     Is patient on the Executive Review Team? NO      All responses suggest: Scheduling with PCP for further intervention

## 2021-06-16 NOTE — TELEPHONE ENCOUNTER
Telephone Encounter by Tracie Milton CMA at 7/31/2019  1:39 PM     Author: Tracie Milton CMA Service: -- Author Type: Certified Medical Assistant    Filed: 7/31/2019  1:45 PM Encounter Date: 7/31/2019 Status: Signed    : Tracie Milton CMA (Certified Medical Assistant)       Medication: Adderall   Last Date Filled 6/3/19   pulled: YES    Only PCP Prescribing? : YES  Taken as prescribed from physician notes? YES    CSA in last year: YES  Random Utox in last year: Not needed  (if any of the above answer NO - schedule with PCP)     Opioids + benzodiazepines? NO  (if the above answer YES - schedule with PCP every 6 months)     Is patient on the Executive Review Team? NO      All responses suggest: Scheduling with PCP for further intervention

## 2021-06-16 NOTE — TELEPHONE ENCOUNTER
Telephone Encounter by Tracie Milton CMA at 2/26/2020  3:41 PM     Author: Tracie Milton CMA Service: -- Author Type: Certified Medical Assistant    Filed: 2/26/2020  4:14 PM Encounter Date: 2/26/2020 Status: Signed    : Tracie Milton CMA (Certified Medical Assistant)       Medication: Adderall   Last Date Filled 12/23/19   pulled: YES    Only PCP Prescribing? : YES  Taken as prescribed from physician notes? YES    CSA in last year: YES  Random Utox in last year: Not needed  (if any of the above answer NO - schedule with PCP)     Opioids + benzodiazepines? NO  (if the above answer YES - schedule with PCP every 6 months)     Is patient on the Executive Review Team? NO      All responses suggest: Refilling prescription

## 2021-06-17 NOTE — TELEPHONE ENCOUNTER
Telephone Encounter by Tracie Milton CMA at 4/15/2020  2:48 PM     Author: Tracie Milton CMA Service: -- Author Type: Certified Medical Assistant    Filed: 4/15/2020  2:50 PM Encounter Date: 4/15/2020 Status: Signed    : Tracie Milton CMA (Certified Medical Assistant)       Medication: Adderall  Last Date Filled 2/28/2020   pulled: YES    Only PCP Prescribing? : YES  Taken as prescribed from physician notes? YES    CSA in last year: YES  Random Utox in last year: Not needed  (if any of the above answer NO - schedule with PCP)     Opioids + benzodiazepines? NO  (if the above answer YES - schedule with PCP every 6 months)     Is patient on the Executive Review Team? NO      All responses suggest: PCP to advise

## 2021-06-17 NOTE — TELEPHONE ENCOUNTER
Controlled Substance Refill Request  Medication Name:   Requested Prescriptions     Pending Prescriptions Disp Refills     dextroamphetamine-amphetamine (ADDERALL) 10 mg Tab tablet 60 tablet 0     Sig: Take 1 tablet by mouth 2 (two) times a day.     Date Last Fill: 4/2/21  Requested Pharmacy: [facility]  Submit electronically to pharmacy  Controlled Substance Agreement on file:   Encounter-Level CSA Scan Date - 10/02/2018:    Scan on 10/11/2018  9:22 AM        Last office visit:  1/12/21

## 2021-06-17 NOTE — TELEPHONE ENCOUNTER
Telephone Encounter by Nicky Prado MA at 4/30/2021  2:33 PM     Author: Nicky Prado MA Service: -- Author Type: Medical Assistant    Filed: 4/30/2021  2:34 PM Encounter Date: 4/29/2021 Status: Signed    : Nicky Prado MA (Medical Assistant)       Prescription Monitoring Program activity reviewed with no discrepancies noted.      Last fill per : 4/2/21  Quantity/days supply: 60 tabs x 30 days     Controlled Substance Agreement on file: Yes  Date: 9/20/19 - needs new CSA    Last office visit with provider:  1/12/2021    Please advise.

## 2021-06-17 NOTE — TELEPHONE ENCOUNTER
Telephone Encounter by Glenn Evans CMA at 6/2/2020 11:53 AM     Author: Glenn Evans CMA Service: -- Author Type: Certified Medical Assistant    Filed: 6/2/2020 11:54 AM Encounter Date: 5/29/2020 Status: Signed    : Glenn Evans CMA (Certified Medical Assistant)       Medication: Adderall  Last Date Filled 04/17/2020   pulled: YES    Only PCP Prescribing? : YES  Taken as prescribed from physician notes? YES    CSA in last year: YES  Random Utox in last year: No Needed  (if any of the above answer NO - schedule with PCP)     Opioids + benzodiazepines? NO  (if the above answer YES - schedule with PCP every 6 months)     Is patient on the Executive Review Team? No      All responses suggest: Refilling prescription

## 2021-06-17 NOTE — TELEPHONE ENCOUNTER
Telephone Encounter by Hien Wyman CMA at 6/2/2020  3:56 PM     Author: Hien Wyman CMA Service: -- Author Type: Certified Medical Assistant    Filed: 6/2/2020  3:58 PM Encounter Date: 5/30/2020 Status: Signed    : Hien Wyman CMA (Certified Medical Assistant)

## 2021-06-17 NOTE — TELEPHONE ENCOUNTER
Telephone Encounter by Tamiko Landaverde LPN at 4/2/2021  2:15 PM     Author: Tamiko Landaverde LPN Service: -- Author Type: Licensed Nurse    Filed: 4/2/2021  2:19 PM Encounter Date: 4/2/2021 Status: Signed    : Tamiko Landaverde LPN (Licensed Nurse)       Medication: Adderall 10 mg  Last Date Filled 3/1/21   pulled: YES          Only PCP Prescribing? : YES  Taken as prescribed from physician notes? YES    CSA in last year: NO  Random Utox in last year: NO  Opioids + benzodiazepines? NO    Is patient on the Executive Review Team? NO    Use of Adderall last discussed with Dr. Ramon on 1/12/21.      All responses suggest: Refilling prescription

## 2021-06-17 NOTE — TELEPHONE ENCOUNTER
Telephone Encounter by Kimberly Moore LPN at 8/24/2020  9:20 AM     Author: Kimberly Moore LPN Service: -- Author Type: Licensed Nurse    Filed: 8/24/2020  9:20 AM Encounter Date: 8/21/2020 Status: Signed    : Kimberly Moore LPN (Licensed Nurse)        pulled:

## 2021-06-17 NOTE — TELEPHONE ENCOUNTER
Telephone Encounter by Kimberly Moore LPN at 7/29/2020  4:24 PM     Author: Kimberly Moore LPN Service: -- Author Type: Licensed Nurse    Filed: 7/29/2020  4:25 PM Encounter Date: 7/28/2020 Status: Signed    : Kimberly Moore LPN (Licensed Nurse)       Medication: Adderall  Last Date Filled 6/3/20   pulled: YES         Only PCP Prescribing? : YES  Taken as prescribed from physician notes? YES    CSA in last year: YES  Random Utox in last year: NO  (if any of the above answer NO - schedule with PCP)     Opioids + benzodiazepines? NO  (if the above answer YES - schedule with PCP every 6 months)     Is patient on the Executive Review Team? no      All responses suggest: Refilling prescription

## 2021-06-17 NOTE — TELEPHONE ENCOUNTER
Telephone Encounter by Tracie Milton CMA at 11/6/2020  2:42 PM     Author: Tracie Milton CMA Service: -- Author Type: Certified Medical Assistant    Filed: 11/6/2020  2:44 PM Encounter Date: 11/4/2020 Status: Signed    : Tracie Milton CMA (Certified Medical Assistant)       Medication: Adderall  Last Date Filled 10/7/2020   pulled: YES    Only PCP Prescribing? : YES  Taken as prescribed from physician notes? YES    CSA in last year: NO  Random Utox in last year: Not needed  (if any of the above answer NO - schedule with PCP)     Opioids + benzodiazepines? NO  (if the above answer YES - schedule with PCP every 6 months)     Is patient on the Executive Review Team? NO      All responses suggest: Scheduling with PCP for further intervention     Scheduled with pcp in [place]

## 2021-06-19 NOTE — LETTER
Letter by Aicha Ramon MD at      Author: Aicha Ramon MD Service: -- Author Type: --    Filed:  Encounter Date: 9/20/2019 Status: (Other)         [facility] INTERNAL MEDICINE  09/20/19    Patient: Layne Tariq  YOB: 1990  Medical Record Number: [id number]  CSN: [id number]                                                                              Non-opioid Controlled Substance Agreement    I understand that my care provider has prescribed a controlled substance to help manage my condition(s). I am taking this medicine to help me function or work. I know this is strong medicine, and that it can cause serious side effects. Controlled substances can be sedating, addicting and may cause a dependency on the drug. They can affect my ability to drive or think, and cause depression. They need to be taken exactly as prescribed. Combining controlled substances with certain medicines or chemicals (such as cocaine, sedatives and tranquilizers, sleeping pills, meth) can be dangerous or even fatal. Also, if I stop controlled substances suddenly, I may have severe withdrawal symptoms.  If not helpful, I may be asked to stop them.    The risks, benefits, and side effects of these medicine(s) were explained to me. I agree that:    1. I will take part in other treatments as advised by my care team. This may be psychiatry or counseling, physical therapy, behavioral therapy, group treatment or a referral to a pain clinic. I will reduce or stop my medicine when my care team tells me to do so.  2. I will take my medicines as prescribed. I will not change the dose or schedule unless my care team tells me to. There will be no refills if I run out early.  I may be contactedwithout warning and asked to complete a urine drug test or pill count at any time.   3. I will keep all my appointments, and understand this is part of the monitoring of controlled substances. My care team may require an office visit for  EVERY controlled substance refill. If I miss appointments or dont follow instructions, my care team may stop my medicine.  4. I will not ask other providers to prescribe controlled substances, and I will not accept controlled substances from other people. If I need another prescribed controlled substance for a new reason, I will tell my care team within 1 business day.  5. I will use one pharmacy to fill all of my controlled substance prescriptions, and it is up to me to make sure that I do not run out of my medicines on weekends or holidays. If my care team is willing to refill my controlled substance prescription without a visit, I must request refills only during office hours, refills may take up to 3 days to process, and it may take up to 5 to 7 days for my medicine to be mailed and ready at my pharmacy. Prescriptions will not be mailed anywhere except my pharmacy.    6. I am responsible for my prescriptions. If the medicine/prescription is lost or stolen, it will not be replaced. I also agree not to share controlled substance medicines with anyone.          [facility] INTERNAL MEDICINE  09/20/19  Patient:  Layne Tariq  YOB: 1990  Medical Record Number: [id number]  CSN: [id number]    7. I agree to not use ANY illegal or recreational drugs. This includes marijuana, cocaine, bath salts or other drugs. I agree not to use alcohol unless my care team says I may. I agree to give urine samples whenever asked. If I dont give a urine sample, the care team may stop my medicine.    8. If I enroll in the [place] Medical Marijuana program, I will tell my care team. I will also sign an agreement to share my medical records with my care team.    9. I will bring in my list of medicines (or my medicine bottles) each time I come to the clinic.   10. I will tell my care team right away if I become pregnant or have a new medical problem treated outside of my regular clinic.  11. I understand that this medicine can  affect my thinking and judgment. It may be unsafe for me to drive, use machinery and do dangerous tasks. I will not do any of these things until I know how the medicine affects me. If my dose changes, I will wait to see how it affects me. I will contact my care team if I have concerns about medicine side effects.    I understand that if I do not follow any of the conditions above, my prescriptions or treatment may be stopped.      I agree that my provider, clinic care team, and pharmacy may work with any city, state or federal law enforcement agency that investigates the misuse, sale, or other diversion of my controlled medicine. I will allow my provider to discuss my care with or share a copy of this agreement with any other treating provider, pharmacy or emergency room where I receive care. I agree to give up (waive) any right of privacy or confidentiality with respect to these consents.   I have read this agreement and have asked questions about anything I did not understand.    ___________________________________________________________________________  Patient signature - Date/Time  -Layne Tariq                                      ___________________________________________________________________________  Witness signature                                                                    ___________________________________________________________________________  Provider signature- Aicha Ramon MD

## 2021-06-20 NOTE — PROGRESS NOTES
[facility] Follow Up Note    Assessment/Plan:  1. Encounter to establish care with new doctor  Layne is a delightful 27-year-old who will be changing primary care to me.  I have met her about 3 years ago.  She has been following with Dr. Thayer for the intercurrent time.  Electronic health record reviewed and updated    2. Attention deficit hyperactivity disorder (ADHD), combined type  Have reviewed [facility] neuropsych tests which are consistent with ADHD.  Have had her sign a controlled substance agreement with me.  Recommendations: Will fill Adderall which is being used on an as-needed basis.  She does not use this more than once per day.  She often takes medication holidays.  - Thyroid [place]; Future    3. Health care maintenance  She is due for maintenance labs  - Lipid [place]; Future  - HM2(CBC w/o Differential); Future  - Basic Metabolic Panel; Future    4. Urinary frequency/vaginal burning  Exam reveals a whitish vaginal discharge.  A wet prep was sent.  Urine analysis is pending.  Recommendation: We will treat with Diflucan for empiric vaginal yeast for vaginal itching and burning.  Will rule out infection.  Additionally, recommend the patient avoid bladder irritant food substances such as caffeine, pineapple, carbonated beverages.  She should reduce fluid intake toward the evening.  Could consider other measures if this continues to be a problem  - Urinalysis-UC if Indicated  - Wet Prep, Vaginal    5.  Possible depression  Have discussed the possibility of antidepressant medication with PHQ 9 of 20.  She declines right now.  She feels that she will improve as she had structure back to her life.  Recommendation: Consider reinitiation of psychotherapy.  Begin more regular routine to include consistent sleep and wake cycles.  Regular exercise and clean food is recommended as well.  Could consider medications and will discuss this at next visit          Aicha Ramon MD    Chief  "Complaint:  Chief Complaint   Patient presents with     Establish Care     Follow-up     Need CSA & Urine tox        History of Present Illness:  Layne is a 27 y.o. female who is here today to establish care.  Of note, she was a patient of mine 3-4 years earlier.  She has since been following with Dr. Thayer.  Her medical problems are reviewed in the electronic health record.    Today, she has some concerns with regard to urinary frequency and urgency.  She also notes some vaginal burning and wonders if she has a yeast infection.  She states that she has noticed urinary frequency for some time.  She attributes this to a \"small bladder \".  She denies any cloudy urine she denies any burning.  She is treated with increase fluids.  With regard to vaginal burning, she denies any preceding antibiotics.  She is currently not sexually active.  She is amenorrheic from her IUD.    With regard to mental health issues, she has been evaluated for ADD.  Her neuropsychiatric test from [facility] were reviewed.  She has seen psychiatry in the past.  She has can been on Adderall at 20 mg/day.  This is taken on an as-needed basis.  With regard to the possibility of depression, she declines any desire for antidepressant medication.  She does not feel that therapy has been helpful in the past.  She believes that she would be feeling much better about things if she was enrolled in school.  She feels badly that she has not yet received her bachelor's degree.  She is about 1 year short.  She is considering enrolling in college for the January semester.    Gynecologic history: She is up-to-date on Pap smear.  She has a Mirena IUD and therefore has secondary amenorrhea    Review of Systems:  A comprehensive review of systems was performed and was otherwise negative    PFSH:  Social History: She is single.  She is currently living with her family  History   Smoking Status     Light Tobacco Smoker   Smokeless Tobacco     " "Current User       Past History: ADD/depression  Current Outpatient Prescriptions   Medication Sig Dispense Refill     dextroamphetamine-amphetamine (ADDERALL XR) 20 MG 24 hr capsule Take 1 capsule (20 mg total) by mouth daily. 30 capsule 0     levonorgestrel (MIRENA) 20 mcg/24 hr (5 years) IUD 1 each by Intrauterine route once. Mirena IUD Inserted by Sarah Hook CNM on 1-6-16   (Due for removal by 1-6-2021)  AktiVax  Lot [id number]  Exp 04/18  NDC 41941-752-04       fluconazole (DIFLUCAN) 150 MG tablet Take 1 tablet (150 mg total) by mouth once for 1 dose. May repeat in one week 2 tablet 0     No current facility-administered medications for this visit.        Family History: Her father recently had a heart attack    Physical Exam:  General Appearance:   She is pleasant although appears slightly nervous  Vitals:    10/02/18 1233   BP: 102/64   Patient Site: Left Arm   Patient Position: Sitting   Cuff Size: Adult Regular   Pulse: 66   Weight: 162 lb (73.5 kg)   Height: 5' 4\" (1.626 m)     Wt Readings from Last 3 Encounters:   10/02/18 162 lb (73.5 kg)   11/15/17 160 lb 9.6 oz (72.8 kg)   10/11/17 165 lb 8 oz (75.1 kg)     Body mass index is 27.81 kg/(m^2).    Head neck exam is unremarkable  Lungs are clear to auscultation percussion  Cardiac exam reveals a regular rate and rhythm with no murmurs or gallops  Abdomen is soft and nontender  Pelvic exam is performed.  Speculum is inserted.  A whitish discharge is noted about the cervix.  Wet prep is taken.  Bimanual exam negative    Data Review:    Analysis and Summary of Old Records (2): Reviewed old records    Records Requested (1): 0      Other History Summarized (from other people in the room) (2): 0    Radiology Tests Summarized (XRAY/CT/MRI/DXA) (1): 0    Labs Reviewed (1): Ordered labs    Medicine Tests Reviewed (EKG/ECHO/COLONOSCOPY/EGD) (1): 0    Independent Review of EKG or X-RAY (2): 0        "

## 2021-06-21 NOTE — LETTER
Letter by Aicha Ramon MD at      Author: Aicha Ramon MD Service: -- Author Type: --    Filed:  Encounter Date: 1/19/2021 Status: (Other)         Layne Tariq  [address] [place] [address]             January 19, 2021         Dear Ms. Tariq,    Below are the results from your recent visit:    Resulted Orders   MTHFR Genotype   Result Value Ref Range    Molecular DX Inherit Disorder Results Below       Comment:      Patient Name: LAYNE TARIQ  MR#: [id number]  Specimen #:   Collected: 1/12/2021 09:07  Received: 1/13/2021 12:04  Reported: 1/18/2021 15:34  Ordering Phy(s): AICHA RAMON    _________________________________________    TEST(S) REQUESTED:    Methylene Tetrahydrofolate Reductase Molecular Analysis    SPECIMEN DESCRIPTION:  Blood    RESULTS:    MTHFR (5,10-methylenetetrahydrofolate reductase) Gene:    MTHFR Gene C677T RESULTS: HETEROZYGOTE    INTERPRETATION:  The patient is heterozygous for the C677T mutation (thermolabile form) in the MTHFR  (5,10-methylenetetrahydrofolate reductase) gene. Heterozygosity for MTHFR does not predispose patients to  hyperhomocysteinemia. Patients with heterozygous or homozygous mutations for the MTHFR C677T mutation may have  more toxicity with methotrexate therapy and children undergoing treatment for ALL with thrombotic risk  (including these mutations)may have a higher risk of thrombosis.     For genetic counseling inquiries  please contact Alin Rosa at [phone].    METHODOLOGY: The region of genomic DNA was isolated containing the C677T mutation (thermolabile form) of the  MTHFR gene and amplified using the polymerase chain reaction. The amplified products were digested with  restriction endonuclease Hinf I and products were analyzed by gel electrophoresis.     COMMENTS:  If a patient is the recipient of an allogeneic bone marrow transplant, this test must be done on a  pre-transplant sample or buccal  swab.  A previous allogeneic bone marrow transplant will interfere with test  results.  Call the [facility]([phone]) for instructions on sample collection for these  patients.    This test was developed and its performance characteristics determined by [facility]. It has not been cleared or approved by the FDA. The laboratory is regulated under CLIA  as qualified to perform high-complexity testing. This test is used for clinical purposes.  It should not be  regarded as investigational or for research.    A resident/fellow in an accredited training program was involved in the selection of testing, review of  laboratory data, and/or interpretation of this case.  I, as the senior physician, attest that I: (i) confirmed  appropriate testing, (ii) examined the relevant raw data for the specimen(s); and (iii) rendered or confirmed  the interpretation(s).    Electronically Signed Out By:   Jean Pierre Means M.D., PhD  [facility]    CPT Codes:  A: 48894-ZUAZJXV, -WGWWGO    TESTING LAB LOCATION:  [address] [id number] [phone]    COLLECTION SITE:  Client:  [facility]  Location:  [facility] ()   Factor V Leiden   Result Value Ref Range    Factor 5 Leiden Mutat PCR Results Below       Comment:      Patient Name: LUZ ELENA LOVELACE  MR#: [id number]  Specimen #:   Collected: 1/12/2021 09:07  Received: 1/13/2021 12:02  Reported: 1/14/2021 15:24  Ordering Phy(s): AMANDA MILLER    _________________________________________    TEST(S) REQUESTED:    Factor 5 Leiden Mutation by PCR    SPECIMEN DESCRIPTION:  Blood    RESULTS:    Factor V 1691G>A (Leiden)  RESULTS:  Mutation analyzed:     1691G>A  Factor V 1691G>A (Leiden)  Interpretation:      ABSENT  Factor V 1691G>A (Leiden) mutation  genotype:      G/G    INTERPRETATION:  The patient is negative for  the Factor V 1691G>A (Leiden) mutation.    METHODOLOGY:   The regions of genomic DNA containing the Q5326T Factor V Leiden gene mutation (Factor V  Leiden) and the Factor 2(Prothrombin S91018V) gene mutation were simultaneously amplified using the polymerase  chain reaction.  The amplified products were digested with restriction endonuclease TaqI and products were  analyzed by gel electrophoresis.    COMMENTS:  If a  patient is the recipient of an allogeneic bone marrow transplant, this test must be done on a  pre-transplant sample or buccal swab.  A previous allogeneic bone marrow transplant will interfere with test  results.  Call the [facility]([phone]) for instructions on sample collection for these  patients.    This test was developed and its performance characteristics determined by [facility]. It has not been cleared or approved by the FDA. The laboratory is regulated under CLIA  as qualified to perform high-complexity testing. This test is used for clinical purposes. It should not be  regarded as investigational or for research.    A resident/fellow in an accredited training program was involved in the selection of testing, review of  laboratory data, and/or interpretation of this case.  I, as the senior physician, attest that I: (i) confirmed  appropriate testing, (ii) examined the relevant raw data for the specimen(s); and  (iii) rendered or confirmed  the interpretation(s).    Electronically Signed Out By:   MIRI Lopez    CPT Codes:  A: 40101-W3IOE, -JZAMCN    TESTING LAB LOCATION:  [address] [id number] [phone]    COLLECTION SITE:  Client:  [facility]  Location:  [facility] ([facility])   Lupus Anticoagulant   Result Value Ref Range    Lupus Result Negative NEG      Comment:      (Note)  COMMENTS:  The INR is normal.  APTT ratio  is normal.  DRVVT Screen ratio is normal.  Thrombin time is normal.  NEGATIVE TEST; A LUPUS ANTICOAGULANT WAS NOT DETECTED IN THIS  SPECIMEN WITHIN THE LIMITS OF THE TESTING REPERTOIRE.  If the clinical picture is strongly suggestive of an antiphospholipid  syndrome, recommend anticardiolipin and beta-2-glycoprotein (IgG and  IgM) antibody tests.  Esme Kirk M.D.  [phone]  1/14/2021    INR =  0.97    Reference range: 0.86-1.14  Thrombin Time= 15.0    Reference range: 13.0-19.0 sec    APTT TEST:  APTT Ratio = 1.00   Normal is less than 1.21    DILUTE MELISSA VIPER VENOM TEST:  Screen Ratio = 1.09   Normal is less than 1.21    Performed and/or entered by:  [address] [address]    Basic Metabolic Panel   Result Value Ref Range    Sodium 138 136 - 145 mmol/L    Potassium 5.4 (H) 3.5 - 5.0 mmol/L    Chloride 104 98 - 107 mmol/L    CO2 25 22 - 31 mmol/L    Anion Gap, Calculation 9 5 - 18 mmol/L    Glucose 94 70 - 125 mg/dL    Calcium 9.7 8.5 - 10.5 mg/dL    BUN 15 8 - 22 mg/dL    Creatinine 0.80 0.60 - 1.10 mg/dL    GFR MDRD Af Amer >60 >60 mL/min/1.73m2    GFR MDRD Non Af Amer >60 >60 mL/min/1.73m2    Narrative    Fasting Glucose reference range is 70-99 mg/dL per  American Diabetes Association (ADA) guidelines.   Hepatic Profile   Result Value Ref Range    Bilirubin, Total 0.6 0.0 - 1.0 mg/dL    Bilirubin, Direct 0.2 <=0.5 mg/dL    Protein, Total 7.2 6.0 - 8.0 g/dL    Albumin 4.3 3.5 - 5.0 g/dL    Alkaline Phosphatase 67 45 - 120 U/L    AST 15 0 - 40 U/L    ALT 14 0 - 45 U/L   HM2(CBC w/o Differential)   Result Value Ref Range    WBC 5.7 4.0 - 11.0 thou/uL    RBC 4.52 3.80 - 5.40 mill/uL    Hemoglobin 13.6 12.0 - 16.0 g/dL    Hematocrit 38.6 35.0 - 47.0 %    MCV 85 80 - 100 fL    MCH 30.1 27.0 - 34.0 pg    MCHC 35.3 32.0 - 36.0 g/dL    RDW 10.5 (L) 11.0 - 14.5 %    Platelets 222 140 - 440 thou/uL    MPV 7.4 7.0 - 10.0 fL   Lipid [place] FASTING    Result Value Ref Range    Cholesterol 179 <=199 mg/dL    Triglycerides 50 <=149 mg/dL    HDL Cholesterol 48 (L) >=50 mg/dL    LDL Calculated 121 <=129 mg/dL    Patient Fasting > 8hrs? Yes    Urinalysis-UC if Indicated   Result Value Ref Range    Color, UA Yellow Colorless, Yellow, Straw, Light Yellow    Clarity, UA Clear Clear    Glucose, UA Negative Negative    Bilirubin, UA Negative Negative    Ketones, UA Negative Negative    Specific Gravity, UA >=1.030 1.005 - 1.030    Blood, UA Negative Negative    pH, UA 5.5 5.0 - 8.0    Protein, UA Negative Negative mg/dL    Urobilinogen, UA 0.2 E.U./dL 0.2 E.U./dL, 1.0 E.U./dL    Nitrite, UA Negative Negative    Leukocytes, UA Small (!) Negative    Bacteria, UA Few (!) None Seen hpf    RBC, UA 3-5 (!) None Seen, 0-2 hpf    WBC, UA 10-25 (!) None Seen, 0-5 hpf    Squam Epithel, UA 0-5 None Seen, 0-5 lpf    WBC Clumps Present (!) None Seen    Narrative    Urine Culture ordered based on [facility]' criteria   Chlamydia trachomatis & Neisseria gonorrhoeae, Amplified Detection   Result Value Ref Range    Chlamydia trachomatis, Amplified Detection Negative Negative    Neisseria gonorrhoeae, Amplified Detection Negative Negative   Thyroid Cascade   Result Value Ref Range    TSH 2.17 0.30 - 5.00 uIU/mL   Culture, Urine   Result Value Ref Range    Culture No Growth        Layne, enclosed are your lab results.  You are heterozygous for the MTHFR gene.  For the most part, you are not at increased risk for blood clotting unless you are on a medication called methotrexate.  You need to have 2 copies of the gene or be homozygous for this to be the case.  As you read through the report, you will see the number to a genetic counselor if you have questions or wish to get further consult.    Also, please let me know if you have any further questions, I would be happy to assist.    Please call with questions or contact us using  MyChart.    Sincerely,        Electronically signed by Aicha Ramon MD

## 2021-06-24 ENCOUNTER — COMMUNICATION - HEALTHEAST (OUTPATIENT)
Dept: INTERNAL MEDICINE | Facility: CLINIC | Age: 31
End: 2021-06-24

## 2021-06-24 DIAGNOSIS — F90.2 ATTENTION DEFICIT HYPERACTIVITY DISORDER (ADHD), COMBINED TYPE: ICD-10-CM

## 2021-06-24 NOTE — TELEPHONE ENCOUNTER
Pt requesting ADDERALL REFILL    Pharm - [facility]    Please call pt back when RX is done    [phone] DMOK

## 2021-06-25 NOTE — TELEPHONE ENCOUNTER
Controlled Substance Refill Request  Medication Name:   Requested Prescriptions     Pending Prescriptions Disp Refills     dextroamphetamine-amphetamine (ADDERALL) 10 mg Tab tablet 60 tablet 0     Sig: Take 1 tablet by mouth 2 (two) times a day.     Date Last Fill: 4/30/21  Requested Pharmacy: [facility]  Submit electronically to pharmacy  Controlled Substance Agreement on file:   Encounter-Level CSA Scan Date - 10/02/2018:    Scan on 10/11/2018  9:22 AM        Last office visit:  1/12/21  Irena Love RN, MA  [facility]    Triage Nurse Advisor

## 2021-06-26 ENCOUNTER — HEALTH MAINTENANCE LETTER (OUTPATIENT)
Age: 31
End: 2021-06-26

## 2021-06-26 NOTE — TELEPHONE ENCOUNTER
Controlled Substance Refill Request  Medication Name:   Requested Prescriptions     Pending Prescriptions Disp Refills     dextroamphetamine-amphetamine (ADDERALL) 10 mg Tab tablet 60 tablet 0     Sig: Take 1 tablet by mouth 2 (two) times a day.     Date Last Fill: 6/7/21  Requested Pharmacy: [facility]  Submit electronically to pharmacy  Controlled Substance Agreement on file:   Encounter-Level CSA Scan Date - 10/02/2018:    Scan on 10/11/2018  9:22 AM        Last office visit:  1/12/21       Cecilia Prado RN, BSN Nurse Triage Advisor 2:32 PM 6/24/2021

## 2021-06-26 NOTE — PROGRESS NOTES
"Hi Dr Ramon's team,    This pt stated via mychart that she had her colposcopy done \"months ago\" with [facility] but we never received these records. Please send a records request for the visit notes and pathology report so the pap team can update her chart accordingly. Thanks!    Hoa Salter RN BSN, Pap Tracking   "

## 2021-06-28 RX ORDER — DEXTROAMPHETAMINE SACCHARATE, AMPHETAMINE ASPARTATE, DEXTROAMPHETAMINE SULFATE AND AMPHETAMINE SULFATE 2.5; 2.5; 2.5; 2.5 MG/1; MG/1; MG/1; MG/1
10 TABLET ORAL 2 TIMES DAILY
Qty: 60 TABLET | Refills: 0 | Status: SHIPPED | OUTPATIENT
Start: 2021-07-06 | End: 2021-07-13

## 2021-07-04 NOTE — TELEPHONE ENCOUNTER
Telephone Encounter by Kimberly Moore LPN at 6/24/2021  3:48 PM     Author: Kimberly Moore LPN Service: -- Author Type: Licensed Nurse    Filed: 6/24/2021  3:51 PM Encounter Date: 6/24/2021 Status: Signed    : Kimberly Moore LPN (Licensed Nurse)       Medication: Adderall  Last Date Filled 6/7/21   pulled: YES       Only PCP Prescribing? : YES  Taken as prescribed from physician notes? YES    CSA in last year: NO  Random Utox in last year: NO  (if any of the above answer NO - schedule with PCP)     Opioids + benzodiazepines? NO  (if the above answer YES - schedule with PCP every 6 months)     Is patient on the Executive Review Team? no      All responses suggest: Refilling prescription when due on 7/6/21.

## 2021-07-04 NOTE — TELEPHONE ENCOUNTER
Telephone Encounter by Tamiko Landaverde LPN at 6/7/2021  7:59 AM     Author: Tamiko Landaverde LPN Service: -- Author Type: Licensed Nurse    Filed: 6/7/2021  8:05 AM Encounter Date: 6/5/2021 Status: Signed    : Tamiko Landaverde LPN (Licensed Nurse)       Use of Adderall was last discussed with Dr. Ramon on 1/12/21.  Patient has an appointment on 7/13 with Dr. Ramon.        Medication: Adderall 10 mg  Last Date Filled 5/1/21   pulled: YES        Only PCP Prescribing? : YES  Taken as prescribed from physician notes? YES    CSA in last year: NO  Random Utox in last year: NO  Opioids + benzodiazepines? NO       Is patient on the Executive Review Team? NO    All responses suggest: Refilling prescription

## 2021-07-08 ENCOUNTER — AMBULATORY - HEALTHEAST (OUTPATIENT)
Dept: OBGYN | Facility: CLINIC | Age: 31
End: 2021-07-08

## 2021-07-13 ENCOUNTER — OFFICE VISIT (OUTPATIENT)
Dept: INTERNAL MEDICINE | Facility: CLINIC | Age: 31
End: 2021-07-13
Payer: COMMERCIAL

## 2021-07-13 VITALS
BODY MASS INDEX: 30 KG/M2 | OXYGEN SATURATION: 99 % | SYSTOLIC BLOOD PRESSURE: 122 MMHG | HEART RATE: 74 BPM | RESPIRATION RATE: 16 BRPM | HEIGHT: 64 IN | DIASTOLIC BLOOD PRESSURE: 66 MMHG | WEIGHT: 175.7 LBS

## 2021-07-13 DIAGNOSIS — F41.1 GENERALIZED ANXIETY DISORDER: Primary | ICD-10-CM

## 2021-07-13 DIAGNOSIS — R87.810 CERVICAL HIGH RISK HPV (HUMAN PAPILLOMAVIRUS) TEST POSITIVE: ICD-10-CM

## 2021-07-13 DIAGNOSIS — F90.2 ATTENTION DEFICIT HYPERACTIVITY DISORDER (ADHD), COMBINED TYPE: ICD-10-CM

## 2021-07-13 PROCEDURE — 90471 IMMUNIZATION ADMIN: CPT | Performed by: INTERNAL MEDICINE

## 2021-07-13 PROCEDURE — 99213 OFFICE O/P EST LOW 20 MIN: CPT | Mod: 25 | Performed by: INTERNAL MEDICINE

## 2021-07-13 PROCEDURE — 90651 9VHPV VACCINE 2/3 DOSE IM: CPT | Performed by: INTERNAL MEDICINE

## 2021-07-13 RX ORDER — FAMOTIDINE 20 MG
TABLET ORAL
COMMUNITY

## 2021-07-13 ASSESSMENT — MIFFLIN-ST. JEOR: SCORE: 1505.94

## 2021-07-30 ENCOUNTER — MYC REFILL (OUTPATIENT)
Dept: INTERNAL MEDICINE | Facility: CLINIC | Age: 31
End: 2021-07-30

## 2021-07-30 DIAGNOSIS — F90.2 ATTENTION DEFICIT HYPERACTIVITY DISORDER (ADHD), COMBINED TYPE: ICD-10-CM

## 2021-08-02 ENCOUNTER — MYC REFILL (OUTPATIENT)
Dept: INTERNAL MEDICINE | Facility: CLINIC | Age: 31
End: 2021-08-02

## 2021-08-02 DIAGNOSIS — F90.2 ATTENTION DEFICIT HYPERACTIVITY DISORDER (ADHD), COMBINED TYPE: ICD-10-CM

## 2021-08-03 DIAGNOSIS — F90.2 ATTENTION DEFICIT HYPERACTIVITY DISORDER (ADHD), COMBINED TYPE: ICD-10-CM

## 2021-08-03 RX ORDER — DEXTROAMPHETAMINE SACCHARATE, AMPHETAMINE ASPARTATE, DEXTROAMPHETAMINE SULFATE AND AMPHETAMINE SULFATE 2.5; 2.5; 2.5; 2.5 MG/1; MG/1; MG/1; MG/1
10 TABLET ORAL 2 TIMES DAILY
Qty: 60 TABLET | Refills: 0 | Status: CANCELLED | OUTPATIENT
Start: 2021-08-03

## 2021-08-03 RX ORDER — DEXTROAMPHETAMINE SACCHARATE, AMPHETAMINE ASPARTATE, DEXTROAMPHETAMINE SULFATE AND AMPHETAMINE SULFATE 2.5; 2.5; 2.5; 2.5 MG/1; MG/1; MG/1; MG/1
10 TABLET ORAL 2 TIMES DAILY
Qty: 60 TABLET | Refills: 0 | Status: SHIPPED | OUTPATIENT
Start: 2021-08-03 | End: 2021-09-03

## 2021-08-04 NOTE — TELEPHONE ENCOUNTER
Routing refill request to provider for review/approval because:  Controlled medication refill request.  Routing to provider's care team.  LIKELY A DUPLICATE REQUEST. SEE DATE THAT RX WAS LAST WRITTEN.    ___________________________________________________________    Copy of Last Rx:        Last office visit with provider:  7/13/21   ___________________________________________________________      Requested Prescriptions   Pending Prescriptions Disp Refills     amphetamine-dextroamphetamine (ADDERALL) 10 MG tablet 60 tablet 0     Sig: Take 1 tablet (10 mg) by mouth 2 times daily       There is no refill protocol information for this order            Fawn Zheng RN 08/03/21 7:14 PM

## 2021-08-09 RX ORDER — DEXTROAMPHETAMINE SACCHARATE, AMPHETAMINE ASPARTATE, DEXTROAMPHETAMINE SULFATE AND AMPHETAMINE SULFATE 2.5; 2.5; 2.5; 2.5 MG/1; MG/1; MG/1; MG/1
TABLET ORAL
Qty: 60 TABLET | Refills: 0 | OUTPATIENT
Start: 2021-08-09

## 2021-09-03 ENCOUNTER — MYC REFILL (OUTPATIENT)
Dept: INTERNAL MEDICINE | Facility: CLINIC | Age: 31
End: 2021-09-03

## 2021-09-03 DIAGNOSIS — F90.2 ATTENTION DEFICIT HYPERACTIVITY DISORDER (ADHD), COMBINED TYPE: ICD-10-CM

## 2021-09-04 NOTE — TELEPHONE ENCOUNTER
Routing refill request to provider for review/approval because:  Control substance request    Last Written Prescription Date:  8/3/21  Last Fill Quantity: 60,  # refills: 0   Last office visit provider:  1/12/21     Requested Prescriptions   Pending Prescriptions Disp Refills     amphetamine-dextroamphetamine (ADDERALL) 10 MG tablet 60 tablet 0     Sig: Take 1 tablet (10 mg) by mouth 2 times daily       There is no refill protocol information for this order          brandon hernandez RN 09/04/21 11:27 AM

## 2021-09-06 RX ORDER — DEXTROAMPHETAMINE SACCHARATE, AMPHETAMINE ASPARTATE, DEXTROAMPHETAMINE SULFATE AND AMPHETAMINE SULFATE 2.5; 2.5; 2.5; 2.5 MG/1; MG/1; MG/1; MG/1
10 TABLET ORAL 2 TIMES DAILY
Qty: 60 TABLET | Refills: 0 | Status: SHIPPED | OUTPATIENT
Start: 2021-09-06 | End: 2021-10-11

## 2021-09-13 ENCOUNTER — TELEPHONE (OUTPATIENT)
Dept: INTERNAL MEDICINE | Facility: CLINIC | Age: 31
End: 2021-09-13

## 2021-09-13 ENCOUNTER — ALLIED HEALTH/NURSE VISIT (OUTPATIENT)
Dept: FAMILY MEDICINE | Facility: CLINIC | Age: 31
End: 2021-09-13
Payer: COMMERCIAL

## 2021-09-13 DIAGNOSIS — Z28.09: Primary | ICD-10-CM

## 2021-09-13 DIAGNOSIS — Z23 NEED FOR VACCINATION: ICD-10-CM

## 2021-09-13 PROCEDURE — 90651 9VHPV VACCINE 2/3 DOSE IM: CPT

## 2021-09-13 PROCEDURE — 90471 IMMUNIZATION ADMIN: CPT

## 2021-09-13 PROCEDURE — 99207 PR NO CHARGE NURSE ONLY: CPT

## 2021-10-11 ENCOUNTER — HEALTH MAINTENANCE LETTER (OUTPATIENT)
Age: 31
End: 2021-10-11

## 2021-10-11 ENCOUNTER — MYC REFILL (OUTPATIENT)
Dept: INTERNAL MEDICINE | Facility: CLINIC | Age: 31
End: 2021-10-11

## 2021-10-11 DIAGNOSIS — F90.2 ATTENTION DEFICIT HYPERACTIVITY DISORDER (ADHD), COMBINED TYPE: ICD-10-CM

## 2021-10-12 RX ORDER — DEXTROAMPHETAMINE SACCHARATE, AMPHETAMINE ASPARTATE, DEXTROAMPHETAMINE SULFATE AND AMPHETAMINE SULFATE 2.5; 2.5; 2.5; 2.5 MG/1; MG/1; MG/1; MG/1
10 TABLET ORAL 2 TIMES DAILY
Qty: 60 TABLET | Refills: 0 | Status: SHIPPED | OUTPATIENT
Start: 2021-10-12 | End: 2021-11-07

## 2021-10-12 NOTE — TELEPHONE ENCOUNTER
Routing refill request to provider for review/approval because:  Drug not on the G refill protocol - controlled medication    Last Written Prescription Date:  9/6/2021  Last Fill Quantity: 60,  # refills: 0   Last office visit provider:  7/13/2021     Requested Prescriptions   Pending Prescriptions Disp Refills     amphetamine-dextroamphetamine (ADDERALL) 10 MG tablet 60 tablet 0     Sig: Take 1 tablet (10 mg) by mouth 2 times daily       There is no refill protocol information for this order          Marybeth Rosario RN 10/11/21 9:42 PM

## 2021-11-07 ENCOUNTER — MYC REFILL (OUTPATIENT)
Dept: INTERNAL MEDICINE | Facility: CLINIC | Age: 31
End: 2021-11-07
Payer: COMMERCIAL

## 2021-11-07 DIAGNOSIS — F90.2 ATTENTION DEFICIT HYPERACTIVITY DISORDER (ADHD), COMBINED TYPE: ICD-10-CM

## 2021-11-09 RX ORDER — DEXTROAMPHETAMINE SACCHARATE, AMPHETAMINE ASPARTATE, DEXTROAMPHETAMINE SULFATE AND AMPHETAMINE SULFATE 2.5; 2.5; 2.5; 2.5 MG/1; MG/1; MG/1; MG/1
10 TABLET ORAL 2 TIMES DAILY
Qty: 60 TABLET | Refills: 0 | Status: SHIPPED | OUTPATIENT
Start: 2021-11-09 | End: 2021-12-02

## 2021-11-09 NOTE — TELEPHONE ENCOUNTER
Routing refill request to provider for review/approval because:  Controlled substance request    Last Written Prescription Date:  10/12/21  Last Fill Quantity: 60,  # refills: 0   Last office visit provider:  7/13/21     Requested Prescriptions   Pending Prescriptions Disp Refills     amphetamine-dextroamphetamine (ADDERALL) 10 MG tablet 60 tablet 0     Sig: Take 1 tablet (10 mg) by mouth 2 times daily       There is no refill protocol information for this order          Beau Calhoun RN 11/09/21 10:15 AM

## 2021-12-02 ENCOUNTER — MYC REFILL (OUTPATIENT)
Dept: INTERNAL MEDICINE | Facility: CLINIC | Age: 31
End: 2021-12-02
Payer: COMMERCIAL

## 2021-12-02 DIAGNOSIS — F90.2 ATTENTION DEFICIT HYPERACTIVITY DISORDER (ADHD), COMBINED TYPE: ICD-10-CM

## 2021-12-03 NOTE — TELEPHONE ENCOUNTER
Routing refill request to provider for review/approval because:  Drug not on the G refill protocol - controlled medication     Last Written Prescription Date:  11/9/2021  Last Fill Quantity: 60,  # refills: 0   Last office visit provider:  7/13/2021    Requested Prescriptions   Pending Prescriptions Disp Refills     amphetamine-dextroamphetamine (ADDERALL) 10 MG tablet 60 tablet 0     Sig: Take 1 tablet (10 mg) by mouth 2 times daily       There is no refill protocol information for this order          Marybeth Rosario RN 12/03/21 3:27 PM

## 2021-12-07 RX ORDER — DEXTROAMPHETAMINE SACCHARATE, AMPHETAMINE ASPARTATE, DEXTROAMPHETAMINE SULFATE AND AMPHETAMINE SULFATE 2.5; 2.5; 2.5; 2.5 MG/1; MG/1; MG/1; MG/1
10 TABLET ORAL 2 TIMES DAILY
Qty: 60 TABLET | Refills: 0 | Status: SHIPPED | OUTPATIENT
Start: 2021-12-07 | End: 2022-01-10

## 2021-12-10 ENCOUNTER — MYC MEDICAL ADVICE (OUTPATIENT)
Dept: INTERNAL MEDICINE | Facility: CLINIC | Age: 31
End: 2021-12-10

## 2021-12-10 ENCOUNTER — E-VISIT (OUTPATIENT)
Dept: INTERNAL MEDICINE | Facility: CLINIC | Age: 31
End: 2021-12-10
Payer: COMMERCIAL

## 2021-12-10 DIAGNOSIS — N39.0 ACUTE UTI (URINARY TRACT INFECTION): Primary | ICD-10-CM

## 2021-12-10 PROCEDURE — 99421 OL DIG E/M SVC 5-10 MIN: CPT | Performed by: INTERNAL MEDICINE

## 2021-12-10 RX ORDER — SULFAMETHOXAZOLE/TRIMETHOPRIM 800-160 MG
1 TABLET ORAL 2 TIMES DAILY
Qty: 6 TABLET | Refills: 0 | Status: SHIPPED | OUTPATIENT
Start: 2021-12-10 | End: 2021-12-13

## 2021-12-10 NOTE — PATIENT INSTRUCTIONS
Dear Layne Tariq    After reviewing your responses, I've been able to diagnose you with a urinary tract infection, which is a common infection of the bladder with bacteria.  This is not a sexually transmitted infection, though urinating immediately after intercourse can help prevent infections.  Drinking lots of fluids is also helpful to clear your current infection and prevent the next one.      I have sent a prescription for antibiotics to your pharmacy to treat this infection.    It is important that you take all of your prescribed medication even if your symptoms are improving after a few doses.  Taking all of your medicine helps prevent the symptoms from returning.     If your symptoms worsen, you develop pain in your back or stomach, develop fevers, or are not improving in 5 days, please contact your primary care provider for an appointment or visit any of our convenient Walk-in or Urgent Care Centers to be seen, which can be found on our website here.    Thanks again for choosing us as your health care partner,    Aicha Ramon MD    Urinary Tract Infections in Women  Urinary tract infections (UTIs) are most often caused by bacteria. These bacteria enter the urinary tract. The bacteria may come from inside the body. Or they may travel from the skin outside the rectum or vagina into the urethra. Female anatomy makes it easy for bacteria from the bowel to enter a woman s urinary tract, which is the most common source of UTI. This means women develop UTIs more often than men. Pain in or around the urinary tract is a common UTI symptom. But the only way to know for sure if you have a UTI for the healthcare provider to test your urine. The two tests that may be done are the urinalysis and urine culture.     Types of UTIs    Cystitis. A bladder infection (cystitis) is the most common UTI in women. You may have urgent or frequent need to pee. You may also have pain, burning when you pee, and bloody  urine.    Urethritis. This is an inflamed urethra, which is the tube that carries urine from the bladder to outside the body. You may have lower stomach or back pain. You may also have urgent or frequent need to pee.    Pyelonephritis. This is a kidney infection. If not treated, it can be serious and damage your kidneys. In severe cases, you may need to stay in the hospital. You may have a fever and lower back pain.    Medicines to treat a UTI  Most UTIs are treated with antibiotics. These kill the bacteria. The length of time you need to take them depends on the type of infection. It may be as short as 3 days. If you have repeated UTIs, you may need a low-dose antibiotic for several months. Take antibiotics exactly as directed. Don t stop taking them until all of the medicine is gone. If you stop taking the antibiotic too soon, the infection may not go away. You may also develop a resistance to the antibiotic. This can make it much harder to treat.   Lifestyle changes to treat and prevent UTIs   The lifestyle changes below will help get rid of your UTI. They may also help prevent future UTIs.     Drink plenty of fluids. This includes water, juice, or other caffeine-free drinks. Fluids help flush bacteria out of your body.    Empty your bladder. Always empty your bladder when you feel the urge to pee. And always pee before going to sleep. Urine that stays in your bladder can lead to infection. Try to pee before and after sex as well.    Practice good personal hygiene. Wipe yourself from front to back after using the toilet. This helps keep bacteria from getting into the urethra.    Use condoms during sex. These help prevent UTIs caused by sexually transmitted bacteria. Also don't use spermicides during sex. These can increase the risk for UTIs. Choose other forms of birth control instead. For women who tend to get UTIs after sex, a low-dose of a preventive antibiotic may be used. Be sure to discuss this option with  your healthcare provider.    Follow up with your healthcare provider as directed. He or she may test to make sure the infection has cleared. If needed, more treatment may be started.  Kitty last reviewed this educational content on 7/1/2019 2000-2021 [employer]. All rights reserved. This information is not intended as a substitute for professional medical care. Always follow your healthcare professional's instructions.

## 2021-12-10 NOTE — TELEPHONE ENCOUNTER
Provider E-Visit time total (minutes): Care questionnaire reviewed.  Symptoms consistent with an uncomplicated UTI.    Allergies reviewed.  No known drug allergies.    Time spent less than 10 minutes.

## 2022-01-10 ENCOUNTER — MYC REFILL (OUTPATIENT)
Dept: INTERNAL MEDICINE | Facility: CLINIC | Age: 32
End: 2022-01-10
Payer: COMMERCIAL

## 2022-01-10 DIAGNOSIS — F90.2 ATTENTION DEFICIT HYPERACTIVITY DISORDER (ADHD), COMBINED TYPE: ICD-10-CM

## 2022-01-11 ENCOUNTER — MYC REFILL (OUTPATIENT)
Dept: INTERNAL MEDICINE | Facility: CLINIC | Age: 32
End: 2022-01-11
Payer: COMMERCIAL

## 2022-01-11 DIAGNOSIS — F90.2 ATTENTION DEFICIT HYPERACTIVITY DISORDER (ADHD), COMBINED TYPE: ICD-10-CM

## 2022-01-12 ENCOUNTER — NURSE TRIAGE (OUTPATIENT)
Dept: NURSING | Facility: CLINIC | Age: 32
End: 2022-01-12
Payer: COMMERCIAL

## 2022-01-12 ASSESSMENT — PATIENT HEALTH QUESTIONNAIRE - PHQ9: SUM OF ALL RESPONSES TO PHQ QUESTIONS 1-9: 23

## 2022-01-12 NOTE — TELEPHONE ENCOUNTER
Reason for Call:  Other prescription    Detailed comments: Patient states she will be running out of medication and would like sent to pharmacy on file as soon as possible. See message in this encounter    Phone Number Patient can be reached at: Cell number on file:    Telephone Information:   Mobile [phone]       Best Time: anytime   Can we leave a detailed message on this number? YES    Call taken on 1/12/2022 at 11:38 AM by Tati Dowell

## 2022-01-12 NOTE — TELEPHONE ENCOUNTER
Patient has exposure yesterday to mother with positive covid.    COVID 19 Nurse Triage Plan/Patient Instructions    Please be aware that novel coronavirus (COVID-19) may be circulating in the community. If you develop symptoms such as fever, cough, or SOB or if you have concerns about the presence of another infection including coronavirus (COVID-19), please contact your health care provider or visit [email or web address].     Disposition/Instructions    Virtual Visit with provider recommended. Reference Visit Selection Guide.    Thank you for taking steps to prevent the spread of this virus.  o Limit your contact with others.  o Wear a simple mask to cover your cough.  o Wash your hands well and often.    Resources    [facility]: About COVID-19: [email or web address]    CDC: What to Do If You're Sick: www.cdc.gov/coronavirus/2019-ncov/about/steps-when-sick.html    CDC: Ending Home Isolation: www.cdc.gov/coronavirus/2019-ncov/hcp/disposition-in-home-patients.html     CDC: Caring for Someone: www.cdc.gov/coronavirus/2019-ncov/if-you-are-sick/care-for-someone.html     [facility]: Interim Guidance for Hospital Discharge to Home: www.[email or web address].[email or web address]/diseases/[email or web address]/hcp/hospdischarge.pdf    [facility] clinical trials (COVID-19 research studies): [email or web address]-clinical-trials     Below are the COVID-19 hotlines at the [facility] of [facility] ([facility]). Interpreters are available.   o For health questions: Call 514-217-6393 or 1-357.975.4883 (7 a.m. to 7 p.m.)  o For questions about schools and childcare: Call 840-016-1585 or 1-312.864.7183 (7 a.m. to 7 p.m.)                   Reason for Disposition    [1] CLOSE CONTACT COVID-19 EXPOSURE within last 14 days AND [2] needs COVID-19 lab test to return to work AND [3] NO symptoms    Additional Information    Negative: COVID-19 lab test positive    Negative: [1] Lives with someone known to have influenza (flu test  positive) AND [2] flu-like symptoms (e.g., cough, runny nose, sore throat, SOB; with or without fever)    Negative: [1] Symptoms of COVID-19 (e.g., cough, fever, SOB, or others) AND [2] HCP diagnosed COVID-19 based on symptoms    Negative: [1] Symptoms of COVID-19 (e.g., cough, fever, SOB, or others) AND [2] lives in an area with community spread    Negative: [1] Symptoms of COVID-19 (e.g., cough, fever, SOB, or others) AND [2] within 14 days of EXPOSURE (close contact) with diagnosed or suspected COVID-19 patient    Negative: [1] Symptoms of COVID-19 (e.g., cough, fever, SOB, or others) AND [2] within 14 days of travel from high-risk area for COVID-19 community spread (identified by CDC)    Negative: [1] Difficulty breathing (shortness of breath) occurs AND [2] onset > 14 days after COVID-19 EXPOSURE (Close Contact)    Negative: [1] Dry cough occurs AND [2] onset > 14 days after COVID-19 EXPOSURE    Negative: [1] Wet cough (i.e., white-yellow, yellow, green, or mili colored sputum) AND [2] onset > 14 days after COVID-19 EXPOSURE    Negative: [1] Common cold symptoms AND [2] onset > 14 days after COVID-19 EXPOSURE    Negative: COVID-19 vaccine reaction suspected (e.g., fever, headache, muscle aches) occurring during days 1-3 after getting vaccine    Negative: COVID-19 vaccine, questions about    Protocols used: CORONAVIRUS (COVID-19) EXPOSURE-A- 8.25.2021

## 2022-01-13 ENCOUNTER — TELEPHONE (OUTPATIENT)
Dept: BEHAVIORAL HEALTH | Facility: TELEHEALTH | Age: 32
End: 2022-01-13
Payer: COMMERCIAL

## 2022-01-13 PROBLEM — R87.810 CERVICAL HIGH RISK HPV (HUMAN PAPILLOMAVIRUS) TEST POSITIVE: Status: ACTIVE | Noted: 2021-01-12

## 2022-01-13 RX ORDER — DEXTROAMPHETAMINE SACCHARATE, AMPHETAMINE ASPARTATE, DEXTROAMPHETAMINE SULFATE AND AMPHETAMINE SULFATE 2.5; 2.5; 2.5; 2.5 MG/1; MG/1; MG/1; MG/1
10 TABLET ORAL 2 TIMES DAILY
Qty: 60 TABLET | Refills: 0 | Status: SHIPPED | OUTPATIENT
Start: 2022-01-13 | End: 2022-02-11

## 2022-01-13 NOTE — TELEPHONE ENCOUNTER
[facility] PHQ-9 Follow-up  Behavioral Health Clinician Triage Service    [facility] PHQ-9 Responses:  [facility] Follow-up to PHQ 1/12/2021 1/12/2022 1/12/2022   PHQ-9 9. Suicide Ideation past 2 weeks Not at all Several days Several days   Thoughts of suicide or self harm in past 2 weeks - - No   Thoughts of suicide or self harm in past 2 weeks - No No   PHQ-9 Safety concerns? - - No   PHQ-9 Safety concerns? - No No        1st Outreach Date: January 13, 2022 Time: 9:37am  Outcome: Left a message for patient to call [facility].  If patient doesn't return the call the [facility] Pool will make one more phone attempt within 24 hours.    [facility]  2nd Outreach Date: January 13, 2022 Time: 10:20am  Outcome: Completed phone conversation / triage service.  See assessment and disposition below.  RYAN Bellamy, [facility] my name is Ingrid Dietz.  I m calling from [facility] to follow-up on a questionnaire you completed on [facility].      If it s ok I d like review a few of your symptoms/responses and a talk briefly  about how you have been doing to see if I might be able to provide some support and guidance.       Address/Location of patient: Home - live with parents  Have any supportive people near them? Yes    Risk Assessment:  Patient has had a history of suicidal ideation: patient reported a history of passive SI without plan or intent  Patient denies current or recent suicidal ideation or behaviors.  Patient denies current or recent homicidal ideation or behaviors.  Patient denies current or recent self injurious behavior or ideation.  Patient denies other safety concerns.  Patient reports there are no firearms in the house  Protective Factors Sense of responsibility to family, Life Satisfaction, Positive coping skills, Positive problem-solving skills, Positive social support and Positive therapeutic releationships   Risk Factors Current high stress    ASQ Assessment:  1. In the past few weeks, have you  wished you were dead?  No  2. In the past few weeks, have you felt that you or your family would be better off if you         were dead?  No  3. In the past week, have you been having thoughts about killing yourself?  No  4. Have you ever tried to kill yourself?  No    Disposition:    - Recommendations / Safety Plan: A safety and risk management plan has not been developed at this time, however patient was encouraged to call [facility] should there be a change in any of these risk factors.       Patient reported that the past few weeks she has had an increase in her anxiety due to the holidays, being off from school, change in normal routine, and being exposed and needing to isolate. Patient reported that she does better when she is in school because it gives her a purpose and something that provides satisfaction, which over the holidays and break from school it was more challenging. Patient reported that she has medication that she takes and she is working on taking it more consistently. She also is waiting for her PCP to approve her refill of Aderall. Patient does have a therapist that she is going to reach out to and get scheduled with. Patient denied current SI or thoughts of self-harm and denied crisis resources. Patient appreciated the phone call.

## 2022-01-13 NOTE — TELEPHONE ENCOUNTER
Routing refill request to provider for review/approval because:  Controlled substance request    Last Written Prescription Date:  12/7/21  Last Fill Quantity: 60,  # refills: 0   Last office visit provider:  7/13/21     Requested Prescriptions   Pending Prescriptions Disp Refills     amphetamine-dextroamphetamine (ADDERALL) 10 MG tablet 60 tablet 0     Sig: Take 1 tablet (10 mg) by mouth 2 times daily       There is no refill protocol information for this order          Beau Calhoun RN 01/13/22 8:52 AM

## 2022-01-14 RX ORDER — DEXTROAMPHETAMINE SACCHARATE, AMPHETAMINE ASPARTATE, DEXTROAMPHETAMINE SULFATE AND AMPHETAMINE SULFATE 2.5; 2.5; 2.5; 2.5 MG/1; MG/1; MG/1; MG/1
10 TABLET ORAL 2 TIMES DAILY
Qty: 60 TABLET | Refills: 0 | OUTPATIENT
Start: 2022-01-14

## 2022-02-11 ENCOUNTER — MYC REFILL (OUTPATIENT)
Dept: INTERNAL MEDICINE | Facility: CLINIC | Age: 32
End: 2022-02-11
Payer: COMMERCIAL

## 2022-02-11 DIAGNOSIS — F41.1 GENERALIZED ANXIETY DISORDER: ICD-10-CM

## 2022-02-11 DIAGNOSIS — F90.2 ATTENTION DEFICIT HYPERACTIVITY DISORDER (ADHD), COMBINED TYPE: ICD-10-CM

## 2022-02-11 DIAGNOSIS — F41.0 PANIC ATTACK: ICD-10-CM

## 2022-02-14 ENCOUNTER — MYC REFILL (OUTPATIENT)
Dept: INTERNAL MEDICINE | Facility: CLINIC | Age: 32
End: 2022-02-14
Payer: COMMERCIAL

## 2022-02-14 DIAGNOSIS — F90.2 ATTENTION DEFICIT HYPERACTIVITY DISORDER (ADHD), COMBINED TYPE: ICD-10-CM

## 2022-02-14 DIAGNOSIS — F41.1 GENERALIZED ANXIETY DISORDER: ICD-10-CM

## 2022-02-14 DIAGNOSIS — F41.0 PANIC ATTACK: ICD-10-CM

## 2022-02-14 RX ORDER — DEXTROAMPHETAMINE SACCHARATE, AMPHETAMINE ASPARTATE, DEXTROAMPHETAMINE SULFATE AND AMPHETAMINE SULFATE 2.5; 2.5; 2.5; 2.5 MG/1; MG/1; MG/1; MG/1
10 TABLET ORAL 2 TIMES DAILY
Qty: 60 TABLET | Refills: 0 | Status: CANCELLED | OUTPATIENT
Start: 2022-02-14

## 2022-02-14 RX ORDER — CITALOPRAM HYDROBROMIDE 20 MG/1
20 TABLET ORAL DAILY
Qty: 90 TABLET | Refills: 3 | Status: CANCELLED | OUTPATIENT
Start: 2022-02-14

## 2022-02-15 RX ORDER — DEXTROAMPHETAMINE SACCHARATE, AMPHETAMINE ASPARTATE, DEXTROAMPHETAMINE SULFATE AND AMPHETAMINE SULFATE 2.5; 2.5; 2.5; 2.5 MG/1; MG/1; MG/1; MG/1
10 TABLET ORAL 2 TIMES DAILY
Qty: 60 TABLET | Refills: 0 | Status: SHIPPED | OUTPATIENT
Start: 2022-02-15 | End: 2022-03-04

## 2022-02-15 RX ORDER — CITALOPRAM HYDROBROMIDE 20 MG/1
20 TABLET ORAL DAILY
Qty: 90 TABLET | Refills: 1 | Status: SHIPPED | OUTPATIENT
Start: 2022-02-15 | End: 2022-05-09

## 2022-02-15 NOTE — TELEPHONE ENCOUNTER
"Last Written Prescription Date:  12/16/20  Last Fill Quantity: 90,  # refills: 3   Last office visit provider:  7/13/21     Requested Prescriptions   Pending Prescriptions Disp Refills     citalopram (CELEXA) 20 MG tablet 90 tablet 3     Sig: Take 1 tablet (20 mg) by mouth daily       SSRIs Protocol Passed - 2/15/2022  9:39 AM        Passed - Recent (12 mo) or future (30 days) visit within the authorizing provider's specialty     Patient has had an office visit with the authorizing provider or a provider within the authorizing providers department within the previous 12 mos or has a future within next 30 days. See \"Patient Info\" tab in inbasket, or \"Choose Columns\" in Meds & Orders section of the refill encounter.              Passed - Medication is active on med list        Passed - Patient is age 18 or older        Passed - No active pregnancy on record        Passed - No positive pregnancy test in last 12 months             Beau Calhoun RN 02/15/22 9:39 AM  "

## 2022-02-15 NOTE — TELEPHONE ENCOUNTER
Routing refill request to provider for review/approval because:  Drug not on the G refill protocol   amphetamine-dextroamphetamine (ADDERALL) 10 MG tablet 60 tablet 0 1/13/2022  No   Sig - Route: Take 1 tablet (10 mg) by mouth 2 times daily - Oral   LAST OV-7/13/21

## 2022-03-04 ENCOUNTER — OFFICE VISIT (OUTPATIENT)
Dept: INTERNAL MEDICINE | Facility: CLINIC | Age: 32
End: 2022-03-04
Payer: COMMERCIAL

## 2022-03-04 VITALS
HEART RATE: 84 BPM | WEIGHT: 161.5 LBS | SYSTOLIC BLOOD PRESSURE: 112 MMHG | DIASTOLIC BLOOD PRESSURE: 66 MMHG | OXYGEN SATURATION: 98 % | HEIGHT: 65 IN | BODY MASS INDEX: 26.91 KG/M2

## 2022-03-04 DIAGNOSIS — F33.41 RECURRENT MAJOR DEPRESSIVE DISORDER, IN PARTIAL REMISSION (H): ICD-10-CM

## 2022-03-04 DIAGNOSIS — Z00.00 ENCOUNTER FOR PREVENTIVE CARE: Primary | ICD-10-CM

## 2022-03-04 DIAGNOSIS — F90.2 ATTENTION DEFICIT HYPERACTIVITY DISORDER (ADHD), COMBINED TYPE: ICD-10-CM

## 2022-03-04 DIAGNOSIS — F41.1 GENERALIZED ANXIETY DISORDER: ICD-10-CM

## 2022-03-04 DIAGNOSIS — Z12.4 CERVICAL CANCER SCREENING: ICD-10-CM

## 2022-03-04 LAB
ANION GAP SERPL CALCULATED.3IONS-SCNC: 10 MMOL/L (ref 5–18)
BUN SERPL-MCNC: 10 MG/DL (ref 8–22)
CALCIUM SERPL-MCNC: 9.9 MG/DL (ref 8.5–10.5)
CHLORIDE BLD-SCNC: 98 MMOL/L (ref 98–107)
CHOLEST SERPL-MCNC: 183 MG/DL
CO2 SERPL-SCNC: 28 MMOL/L (ref 22–31)
CREAT SERPL-MCNC: 0.86 MG/DL (ref 0.6–1.1)
ERYTHROCYTE [DISTWIDTH] IN BLOOD BY AUTOMATED COUNT: 11.5 % (ref 10–15)
FASTING STATUS PATIENT QL REPORTED: NO
GFR SERPL CREATININE-BSD FRML MDRD: >90 ML/MIN/1.73M2
GLUCOSE BLD-MCNC: 79 MG/DL (ref 70–125)
HCT VFR BLD AUTO: 43.5 % (ref 35–47)
HDLC SERPL-MCNC: 53 MG/DL
HGB BLD-MCNC: 14.2 G/DL (ref 11.7–15.7)
LDLC SERPL CALC-MCNC: 119 MG/DL
MCH RBC QN AUTO: 28.7 PG (ref 26.5–33)
MCHC RBC AUTO-ENTMCNC: 32.6 G/DL (ref 31.5–36.5)
MCV RBC AUTO: 88 FL (ref 78–100)
PLATELET # BLD AUTO: 229 10E3/UL (ref 150–450)
POTASSIUM BLD-SCNC: 4 MMOL/L (ref 3.5–5)
RBC # BLD AUTO: 4.95 10E6/UL (ref 3.8–5.2)
SODIUM SERPL-SCNC: 136 MMOL/L (ref 136–145)
TRIGL SERPL-MCNC: 56 MG/DL
TSH SERPL DL<=0.005 MIU/L-ACNC: 1.47 UIU/ML (ref 0.3–5)
WBC # BLD AUTO: 6.1 10E3/UL (ref 4–11)

## 2022-03-04 PROCEDURE — 96127 BRIEF EMOTIONAL/BEHAV ASSMT: CPT | Performed by: INTERNAL MEDICINE

## 2022-03-04 PROCEDURE — 90471 IMMUNIZATION ADMIN: CPT | Performed by: INTERNAL MEDICINE

## 2022-03-04 PROCEDURE — 84443 ASSAY THYROID STIM HORMONE: CPT | Performed by: INTERNAL MEDICINE

## 2022-03-04 PROCEDURE — 85027 COMPLETE CBC AUTOMATED: CPT | Performed by: INTERNAL MEDICINE

## 2022-03-04 PROCEDURE — 80061 LIPID PANEL: CPT | Performed by: INTERNAL MEDICINE

## 2022-03-04 PROCEDURE — 36415 COLL VENOUS BLD VENIPUNCTURE: CPT | Performed by: INTERNAL MEDICINE

## 2022-03-04 PROCEDURE — 99395 PREV VISIT EST AGE 18-39: CPT | Mod: 25 | Performed by: INTERNAL MEDICINE

## 2022-03-04 PROCEDURE — 90651 9VHPV VACCINE 2/3 DOSE IM: CPT | Performed by: INTERNAL MEDICINE

## 2022-03-04 PROCEDURE — 80048 BASIC METABOLIC PNL TOTAL CA: CPT | Performed by: INTERNAL MEDICINE

## 2022-03-04 RX ORDER — DEXTROAMPHETAMINE SACCHARATE, AMPHETAMINE ASPARTATE, DEXTROAMPHETAMINE SULFATE AND AMPHETAMINE SULFATE 2.5; 2.5; 2.5; 2.5 MG/1; MG/1; MG/1; MG/1
10 TABLET ORAL 2 TIMES DAILY
Qty: 60 TABLET | Refills: 0 | Status: SHIPPED | OUTPATIENT
Start: 2022-03-04 | End: 2022-03-29

## 2022-03-04 RX ORDER — DEXTROAMPHETAMINE SACCHARATE, AMPHETAMINE ASPARTATE, DEXTROAMPHETAMINE SULFATE AND AMPHETAMINE SULFATE 2.5; 2.5; 2.5; 2.5 MG/1; MG/1; MG/1; MG/1
10 TABLET ORAL 2 TIMES DAILY
Qty: 60 TABLET | Refills: 0 | Status: CANCELLED | OUTPATIENT
Start: 2022-03-04

## 2022-03-04 ASSESSMENT — PATIENT HEALTH QUESTIONNAIRE - PHQ9
SUM OF ALL RESPONSES TO PHQ QUESTIONS 1-9: 23
10. IF YOU CHECKED OFF ANY PROBLEMS, HOW DIFFICULT HAVE THESE PROBLEMS MADE IT FOR YOU TO DO YOUR WORK, TAKE CARE OF THINGS AT HOME, OR GET ALONG WITH OTHER PEOPLE: SOMEWHAT DIFFICULT
SUM OF ALL RESPONSES TO PHQ QUESTIONS 1-9: 23

## 2022-03-04 NOTE — PROGRESS NOTES
SUBJECTIVE:   CC: Layne Tariq is an 31 year old woman who presents for preventive health visit.       Patient has been advised of split billing requirements and indicates understanding: Yes  She is a delightful 31-year-old female who has a history of ADD/depression and anxiety.  Overall, she is doing well.  Of note, her PHQ-9 equals 8.  She is not suicidal.  She states those thoughts have past and have not been present for greater than a year.  She does occasionally forget to take her citalopram.  When this occurs, her mood lessens.    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  She is looking forward to the next steps of her life.    Health maintenance is reviewed.  She had an abnormal Pap smear and has been following [facility].  Her next Pap smear is due in June.  She had colposcopy on June 30 of this year.  She will be following up with Dr. Kayode maldonado for replacement of her IUD as well as the follow-up Pap smear.    She is due for an HPV vaccine.    She is currently not sexually active.  She does not have menstrual cycles with the Mirena IUD which is due for replacement soon.    Healthy Habits:     Getting at least 3 servings of Calcium per day:  Yes    Bi-annual eye exam:  Yes    Dental care twice a year:  Yes    Sleep apnea or symptoms of sleep apnea:  Daytime drowsiness    Diet:  Regular (no restrictions)    Frequency of exercise:  4-5 days/week    Duration of exercise:  45-60 minutes    Taking medications regularly:  Yes    Medication side effects:  None    PHQ-2 Total Score: 2    Additional concerns today:  No              Today's PHQ-2 Score:   PHQ-2 ( 1999 Pfizer) 3/4/2022   Q1: Little interest or pleasure in doing things 1   Q2: Feeling down, depressed or hopeless 1   PHQ-2 Score 2   Q1: Little interest or pleasure in doing things Several days   Q2: Feeling down, depressed or hopeless More than half the days   PHQ-2 Score 3       Abuse: Current or  Past (Physical, Sexual or Emotional) - NO  Do you feel safe in your environment? Yes    Have you ever done Advance Care Planning? (For example, a Health Directive, POLST, or a discussion with a medical provider or your loved ones about your wishes): No, advance care planning information given to patient to review.  Patient declined advance care planning discussion at this time.    Social History     Tobacco Use     Smoking status: Current Some Day Smoker     Smokeless tobacco: Current User   Substance Use Topics     Alcohol use: Yes     Alcohol/week: 0.8 standard drinks       Alcohol Use 3/4/2022   Prescreen: >3 drinks/day or >7 drinks/week? Not Applicable       Reviewed orders with patient.  Reviewed health maintenance and updated orders accordingly - Yes  Lab work is in process    Breast Cancer Screening:    FHS-7:   Breast CA Risk Assessment (FHS-7) 1/12/2022 1/12/2022 3/4/2022   Did any of your first-degree relatives have breast or ovarian cancer? No No No   Did any of your relatives have bilateral breast cancer? No No No   Did any man in your family have breast cancer? No No No   Did any woman in your family have breast and ovarian cancer? No No No   Did any woman in your family have breast cancer before age 50 y? No No No   Do you have 2 or more relatives with breast and/or ovarian cancer? No No No   Do you have 2 or more relatives with breast and/or bowel cancer? No No No         History of abnormal Pap smear: Following with [facility].  Next Pap smear is June 2022.  Colposcopy was done in June 2021 and was negative.  Needs her gynecology recommendations.  PAP / HPV Latest Ref Rng & Units 1/12/2021 11/15/2017   PAP Negative for squamous intraepithelial lesion or malignancy. Negative for squamous intraepithelial lesion or malignancy  Electronically signed by Marybeth Pickett CT (ASCP) on 1/20/2021 at  3:54 PM   Negative for squamous intraepithelial lesion or malignancy  Electronically signed by  "Leroy La, CT (ASCP) on 11/24/2017 at  4:12 PM     HPV16 NEG Positive(A) -   HPV18 NEG Negative -   HRHPV NEG Negative -     Reviewed and updated as needed this visit by clinical staff   Tobacco  Allergies  Meds              Reviewed and updated as needed this visit by Provider                 Past Medical History:   Diagnosis Date     Cervical high risk HPV (human papillomavirus) test positive 1/12/2021 4/25/14 NIL 11/15/17 NIL 1/12/21 NIL pap, +HR HPV 16. Plan: colposcopy due before 4/12/21        Review of Systems  CONSTITUTIONAL: NEGATIVE for fever, chills, change in weight  INTEGUMENTARU/SKIN: NEGATIVE for worrisome rashes, moles or lesions  EYES: NEGATIVE for vision changes or irritation  ENT: NEGATIVE for ear, mouth and throat problems  RESP: NEGATIVE for significant cough or SOB  BREAST: NEGATIVE for masses, tenderness or discharge  CV: NEGATIVE for chest pain, palpitations or peripheral edema  GI: NEGATIVE for nausea, abdominal pain, heartburn, or change in bowel habits  : NEGATIVE for unusual urinary or vaginal symptoms. Periods are regular.  MUSCULOSKELETAL: NEGATIVE for significant arthralgias or myalgia  NEURO: NEGATIVE for weakness, dizziness or paresthesias  PSYCHIATRIC: NEGATIVE for changes in mood or affect     OBJECTIVE:   /66 (BP Location: Left arm, Patient Position: Sitting, Cuff Size: Adult Regular)   Pulse 84   Ht 1.638 m (5' 4.5\")   Wt 73.3 kg (161 lb 8 oz)   SpO2 98%   BMI 27.29 kg/m    Physical Exam  GENERAL APPEARANCE: healthy, alert and no distress  EYES: Eyes grossly normal to inspection, PERRL and conjunctivae and sclerae normal  HENT: ear canals and TM's normal, nose and mouth without ulcers or lesions, oropharynx clear and oral mucous membranes moist  NECK: no adenopathy, no asymmetry, masses, or scars and thyroid normal to palpation  RESP: lungs clear to auscultation - no rales, rhonchi or wheezes  BREAST: normal without masses, tenderness or nipple " "discharge and no palpable axillary masses or adenopathy  CV: regular rate and rhythm, normal S1 S2, no S3 or S4, no murmur, click or rub, no peripheral edema and peripheral pulses strong  ABDOMEN: soft, nontender, no hepatosplenomegaly, no masses and bowel sounds normal  MS: no musculoskeletal defects are noted and gait is age appropriate without ataxia  SKIN: no suspicious lesions or rashes  NEURO: Normal strength and tone, sensory exam grossly normal, mentation intact and speech normal  PSYCH: mentation appears normal and affect normal/bright    Diagnostic Test Results:  Labs reviewed in Epic  none     ASSESSMENT/PLAN:   (Z00.00) Encounter for preventive care  (primary encounter diagnosis)  Comment: We will update HPV 9 vaccine.  She is exercising and has lost 15 pounds.  Eye and dental care up-to-date.  Seeing gynecology for abnormal Pap and IUD replacement.  Plan: CBC with platelets, Basic metabolic panel, TSH         with free T4 reflex, Lipid panel reflex to         direct LDL Fasting        \    (F90.2) Attention deficit hyperactivity disorder (ADHD), combined type  Comment: We will renew medication  Plan: amphetamine-dextroamphetamine (ADDERALL) 10 MG         tablet            (F33.41) Recurrent major depressive disorder, in partial remission (H)  Comment: PHQ-9 equals 8.  She does well when adherent to medications  Plan:     (Z12.4) Cervical cancer screening  Comment: Due for Pap smear and IUD replacement June 2022  Plan: See OB/GYN    (F41.1) Generalized anxiety disorder  Comment: As above-stable when on medication  Plan:       Estimated body mass index is 27.29 kg/m  as calculated from the following:    Height as of this encounter: 1.638 m (5' 4.5\").    Weight as of this encounter: 73.3 kg (161 lb 8 oz).    Weight management plan: Discussed healthy diet and exercise guidelines          Counseling Resources:  ATP IV Guidelines  Pooled Cohorts Equation Calculator  Breast Cancer Risk Calculator  BRCA-Related " Cancer Risk Assessment: FHS-7 Tool  FRAX Risk Assessment  ICSI Preventive Guidelines  Dietary Guidelines for Americans, 2010  USDA's MyPlate  ASA Prophylaxis  Lung CA Screening    Aicha Ramon MD  [facility]  Answers for HPI/ROS submitted by the patient on 3/4/2022  If you checked off any problems, how difficult have these problems made it for you to do your work, take care of things at home, or get along with other people?: Somewhat difficult  PHQ9 TOTAL SCORE: 23

## 2022-03-29 ENCOUNTER — MYC REFILL (OUTPATIENT)
Dept: INTERNAL MEDICINE | Facility: CLINIC | Age: 32
End: 2022-03-29
Payer: COMMERCIAL

## 2022-03-29 DIAGNOSIS — F90.2 ATTENTION DEFICIT HYPERACTIVITY DISORDER (ADHD), COMBINED TYPE: ICD-10-CM

## 2022-03-30 ENCOUNTER — PATIENT OUTREACH (OUTPATIENT)
Dept: INTERNAL MEDICINE | Facility: CLINIC | Age: 32
End: 2022-03-30
Payer: COMMERCIAL

## 2022-03-30 DIAGNOSIS — R87.810 CERVICAL HIGH RISK HPV (HUMAN PAPILLOMAVIRUS) TEST POSITIVE: ICD-10-CM

## 2022-03-30 NOTE — LETTER
March 31, 2022      Layne Tariq  [address]  [place] [address]        Dear ,    This letter is to remind you that you are due for your follow-up Pap smear and Human Papillomavirus (HPV) test.    Please call [phone] to schedule your appointment at your earliest convenience.    If you have completed the appointment outside of the [facility] system, please have the records forwarded to our office. We will update your chart for your provider to review before your next annual wellness visit.     Thank you for choosing [facility]!      Sincerely,    Your [facility] Care Team

## 2022-03-30 NOTE — TELEPHONE ENCOUNTER
"Hi Dr. Ramon,   When I review the pt's chart I see a note that was scanned in on 06/30/21 but the actual [place] was done 02/2/21 and the provider said to return in 1 year. Okay to send the pt a reminder now for a cotest? Please advise.     Pap Hx:  4/25/14 NIL  11/15/17 NIL  1/12/21 NIL pap, +HR HPV 16. Plan: colposcopy due before 4/12/21 - [facility]  2/2/21 Colpo ECC cervicitis, bx LSIL. Plan: cotest in 1 year (scanned records [facility])  3/4/22 Appt, no pap done, office visit notes said \"She had an abnormal Pap smear and has been following with [facility].  Her next Pap smear is due in June.  She had colposcopy on June 30 of this year.\"     "

## 2022-03-31 NOTE — TELEPHONE ENCOUNTER
Routing refill request to provider for review/approval because:  Controlled Substance Request.    Last Written Prescription Date:  03/04/2022  Last Fill Quantity: 60,  # refills: 0   Last office visit provider:   03/04/2022 with Dr Ramon.    Requested Prescriptions   Pending Prescriptions Disp Refills     amphetamine-dextroamphetamine (ADDERALL) 10 MG tablet 60 tablet 0     Sig: Take 1 tablet (10 mg) by mouth 2 times daily       There is no refill protocol information for this order          Khloe Curry 03/31/22 3:20 PM

## 2022-04-01 RX ORDER — DEXTROAMPHETAMINE SACCHARATE, AMPHETAMINE ASPARTATE, DEXTROAMPHETAMINE SULFATE AND AMPHETAMINE SULFATE 2.5; 2.5; 2.5; 2.5 MG/1; MG/1; MG/1; MG/1
10 TABLET ORAL 2 TIMES DAILY
Qty: 60 TABLET | Refills: 0 | Status: SHIPPED | OUTPATIENT
Start: 2022-04-01 | End: 2022-05-05

## 2022-05-05 ENCOUNTER — MYC REFILL (OUTPATIENT)
Dept: INTERNAL MEDICINE | Facility: CLINIC | Age: 32
End: 2022-05-05
Payer: COMMERCIAL

## 2022-05-05 DIAGNOSIS — F90.2 ATTENTION DEFICIT HYPERACTIVITY DISORDER (ADHD), COMBINED TYPE: ICD-10-CM

## 2022-05-05 DIAGNOSIS — F41.1 GENERALIZED ANXIETY DISORDER: ICD-10-CM

## 2022-05-05 DIAGNOSIS — F41.0 PANIC ATTACK: ICD-10-CM

## 2022-05-08 NOTE — TELEPHONE ENCOUNTER
Routing refill request to provider for review/approval because:  Controlled substance    Last Written Prescription Date:  4/1/2022  Last Fill Quantity: 60,  # refills: 0   Last office visit provider:  34/2022    Requested Prescriptions   Pending Prescriptions Disp Refills     amphetamine-dextroamphetamine (ADDERALL) 10 MG tablet 60 tablet 0     Sig: Take 1 tablet (10 mg) by mouth 2 times daily       There is no refill protocol information for this order          Tierra Walsh RN 05/08/22 12:19 PM

## 2022-05-09 RX ORDER — CITALOPRAM HYDROBROMIDE 20 MG/1
20 TABLET ORAL DAILY
Qty: 90 TABLET | Refills: 1 | Status: SHIPPED | OUTPATIENT
Start: 2022-05-09 | End: 2022-11-08

## 2022-05-09 RX ORDER — DEXTROAMPHETAMINE SACCHARATE, AMPHETAMINE ASPARTATE, DEXTROAMPHETAMINE SULFATE AND AMPHETAMINE SULFATE 2.5; 2.5; 2.5; 2.5 MG/1; MG/1; MG/1; MG/1
10 TABLET ORAL 2 TIMES DAILY
Qty: 60 TABLET | Refills: 0 | Status: SHIPPED | OUTPATIENT
Start: 2022-05-09 | End: 2022-06-14

## 2022-05-24 NOTE — TELEPHONE ENCOUNTER
"Dain Ramon,   Patient is lost to pap tracking follow-up. Attempts to contact pt have been made per reminder process and there has been no reply and/or no appt scheduled.       Pap Hx:  4/25/14 NIL  11/15/17 NIL  1/12/21 NIL pap, +HR HPV 16. Plan: colposcopy due before 4/12/21 - [facility]  2/2/21 Colpo ECC cervicitis, bx LSIL. Plan: cotest in 1 year (scanned records [facility])  3/4/22 Appt, no Pap done, office visit notes said \"She had an abnormal Pap smear and has been following with [facility].  Her next Pap smear is due in June.  She had colposcopy on June 30 of this year.\"   03/20/22 Message sent to the provider as the last colp was 02/2/21, requesting to send a reminder now. Provider agreed.   03/31/22  Reminder Herbertt, Letter  04/26/22 Reminder call-lm  05/24/22 Lost to follow-up for pap tracking, cortes routed to provider    "

## 2022-06-14 ENCOUNTER — MYC REFILL (OUTPATIENT)
Dept: INTERNAL MEDICINE | Facility: CLINIC | Age: 32
End: 2022-06-14
Payer: COMMERCIAL

## 2022-06-14 DIAGNOSIS — F90.2 ATTENTION DEFICIT HYPERACTIVITY DISORDER (ADHD), COMBINED TYPE: ICD-10-CM

## 2022-06-15 NOTE — TELEPHONE ENCOUNTER
Routing refill request to provider for review/approval because:  Drug not on the [facility] refill protocol     Last Written Prescription Date:  5/9/22  Last Fill Quantity: 60,  # refills: 0   Last office visit provider:  3/4/22     Requested Prescriptions   Pending Prescriptions Disp Refills     amphetamine-dextroamphetamine (ADDERALL) 10 MG tablet 60 tablet 0     Sig: Take 1 tablet (10 mg) by mouth 2 times daily       There is no refill protocol information for this order          Palak Clark, RN 06/15/22 3:51 PM

## 2022-06-16 RX ORDER — DEXTROAMPHETAMINE SACCHARATE, AMPHETAMINE ASPARTATE, DEXTROAMPHETAMINE SULFATE AND AMPHETAMINE SULFATE 2.5; 2.5; 2.5; 2.5 MG/1; MG/1; MG/1; MG/1
10 TABLET ORAL 2 TIMES DAILY
Qty: 60 TABLET | Refills: 0 | Status: SHIPPED | OUTPATIENT
Start: 2022-06-16 | End: 2022-07-05

## 2022-07-05 ENCOUNTER — MYC REFILL (OUTPATIENT)
Dept: INTERNAL MEDICINE | Facility: CLINIC | Age: 32
End: 2022-07-05

## 2022-07-05 DIAGNOSIS — F90.2 ATTENTION DEFICIT HYPERACTIVITY DISORDER (ADHD), COMBINED TYPE: ICD-10-CM

## 2022-07-07 RX ORDER — DEXTROAMPHETAMINE SACCHARATE, AMPHETAMINE ASPARTATE, DEXTROAMPHETAMINE SULFATE AND AMPHETAMINE SULFATE 2.5; 2.5; 2.5; 2.5 MG/1; MG/1; MG/1; MG/1
10 TABLET ORAL 2 TIMES DAILY
Qty: 60 TABLET | Refills: 0 | Status: SHIPPED | OUTPATIENT
Start: 2022-07-07 | End: 2022-08-10

## 2022-08-08 ENCOUNTER — MYC REFILL (OUTPATIENT)
Dept: INTERNAL MEDICINE | Facility: CLINIC | Age: 32
End: 2022-08-08

## 2022-08-08 DIAGNOSIS — F90.2 ATTENTION DEFICIT HYPERACTIVITY DISORDER (ADHD), COMBINED TYPE: ICD-10-CM

## 2022-08-10 ENCOUNTER — MYC REFILL (OUTPATIENT)
Dept: INTERNAL MEDICINE | Facility: CLINIC | Age: 32
End: 2022-08-10

## 2022-08-10 DIAGNOSIS — F90.2 ATTENTION DEFICIT HYPERACTIVITY DISORDER (ADHD), COMBINED TYPE: ICD-10-CM

## 2022-08-12 RX ORDER — DEXTROAMPHETAMINE SACCHARATE, AMPHETAMINE ASPARTATE, DEXTROAMPHETAMINE SULFATE AND AMPHETAMINE SULFATE 2.5; 2.5; 2.5; 2.5 MG/1; MG/1; MG/1; MG/1
10 TABLET ORAL 2 TIMES DAILY
Qty: 60 TABLET | Refills: 0 | Status: SHIPPED | OUTPATIENT
Start: 2022-08-12 | End: 2022-12-19

## 2022-08-15 RX ORDER — DEXTROAMPHETAMINE SACCHARATE, AMPHETAMINE ASPARTATE, DEXTROAMPHETAMINE SULFATE AND AMPHETAMINE SULFATE 2.5; 2.5; 2.5; 2.5 MG/1; MG/1; MG/1; MG/1
10 TABLET ORAL 2 TIMES DAILY
Qty: 60 TABLET | Refills: 0 | Status: SHIPPED | OUTPATIENT
Start: 2022-08-15 | End: 2022-09-07

## 2022-08-29 DIAGNOSIS — F41.1 GENERALIZED ANXIETY DISORDER: ICD-10-CM

## 2022-08-29 DIAGNOSIS — F41.0 PANIC ATTACK: ICD-10-CM

## 2022-08-29 RX ORDER — CITALOPRAM HYDROBROMIDE 20 MG/1
20 TABLET ORAL DAILY
Qty: 90 TABLET | Refills: 1 | OUTPATIENT
Start: 2022-08-29

## 2022-09-07 ENCOUNTER — MYC REFILL (OUTPATIENT)
Dept: INTERNAL MEDICINE | Facility: CLINIC | Age: 32
End: 2022-09-07

## 2022-09-07 DIAGNOSIS — F90.2 ATTENTION DEFICIT HYPERACTIVITY DISORDER (ADHD), COMBINED TYPE: ICD-10-CM

## 2022-09-08 RX ORDER — DEXTROAMPHETAMINE SACCHARATE, AMPHETAMINE ASPARTATE, DEXTROAMPHETAMINE SULFATE AND AMPHETAMINE SULFATE 2.5; 2.5; 2.5; 2.5 MG/1; MG/1; MG/1; MG/1
10 TABLET ORAL 2 TIMES DAILY
Qty: 60 TABLET | Refills: 0 | Status: SHIPPED | OUTPATIENT
Start: 2022-09-08 | End: 2022-10-07

## 2022-09-25 ENCOUNTER — HEALTH MAINTENANCE LETTER (OUTPATIENT)
Age: 32
End: 2022-09-25

## 2022-10-07 ENCOUNTER — MYC REFILL (OUTPATIENT)
Dept: INTERNAL MEDICINE | Facility: CLINIC | Age: 32
End: 2022-10-07

## 2022-10-07 DIAGNOSIS — F90.2 ATTENTION DEFICIT HYPERACTIVITY DISORDER (ADHD), COMBINED TYPE: ICD-10-CM

## 2022-10-07 RX ORDER — DEXTROAMPHETAMINE SACCHARATE, AMPHETAMINE ASPARTATE, DEXTROAMPHETAMINE SULFATE AND AMPHETAMINE SULFATE 2.5; 2.5; 2.5; 2.5 MG/1; MG/1; MG/1; MG/1
10 TABLET ORAL 2 TIMES DAILY
Qty: 60 TABLET | Refills: 0 | Status: SHIPPED | OUTPATIENT
Start: 2022-10-07 | End: 2022-11-06

## 2022-11-06 ENCOUNTER — MYC REFILL (OUTPATIENT)
Dept: INTERNAL MEDICINE | Facility: CLINIC | Age: 32
End: 2022-11-06

## 2022-11-06 DIAGNOSIS — F90.2 ATTENTION DEFICIT HYPERACTIVITY DISORDER (ADHD), COMBINED TYPE: ICD-10-CM

## 2022-11-08 RX ORDER — DEXTROAMPHETAMINE SACCHARATE, AMPHETAMINE ASPARTATE, DEXTROAMPHETAMINE SULFATE AND AMPHETAMINE SULFATE 2.5; 2.5; 2.5; 2.5 MG/1; MG/1; MG/1; MG/1
10 TABLET ORAL 2 TIMES DAILY
Qty: 60 TABLET | Refills: 0 | Status: SHIPPED | OUTPATIENT
Start: 2022-11-08 | End: 2022-12-19

## 2022-11-17 NOTE — PROGRESS NOTES
[facility] Department of Anesthesiology  Pre-Anesthesia Evaluation/Consultation       Name:  Eleni Hernandez  : 1969  Age:  48 y.o. MRN:  [id number]  Date: 2022           Surgeon: Surgeon(s):  Lurdes George DO    Procedure: Procedure(s):  EGD DIAGNOSTIC ONLY  ILEOSCOPY DX ONLY     No Known Allergies  Patient Active Problem List   Diagnosis    Rectal bleeding    Bleeding from colostomy stoma [facility])     Past Medical History:   Diagnosis Date    HIV (human immunodeficiency virus infection) ([facility] [address]      No past surgical history on file. Medications  No current facility-administered medications on file prior to encounter. Current Outpatient Medications on File Prior to Encounter   Medication Sig Dispense Refill    albuterol sulfate HFA (PROVENTIL;VENTOLIN;PROAIR) 108 (90 Base) MCG/ACT inhaler Inhale 2 puffs into the lungs every 6 hours as needed for Wheezing or Shortness of Breath      atorvastatin (LIPITOR) 40 MG tablet Take 40 mg by mouth at bedtime      celecoxib (CELEBREX) 100 MG capsule Take 100 mg by mouth daily      dapagliflozin (FARXIGA) 10 MG tablet Take 10 mg by mouth every morning      emtricitabine-tenofovir alafenamide (DESCOVY) 200-25 MG TABS tablet Take 1 tablet by mouth daily      insulin glargine (BASAGLAR KWIKPEN) 100 UNIT/ML injection pen Inject 18 Units into the skin nightly      albuterol-ipratropium (COMBIVENT RESPIMAT)  MCG/ACT AERS inhaler Inhale 1 puff into the lungs in the morning and 1 puff at noon and 1 puff in the evening and 1 puff before bedtime.       lisinopril (PRINIVIL;ZESTRIL) 2.5 MG tablet Take 2.5 mg by mouth daily      potassium chloride (KLOR-CON M) 10 MEQ extended release tablet Take 10 mEq by mouth daily      QUEtiapine (SEROQUEL XR) 400 MG extended release tablet Take 800 mg by mouth nightly      raltegravir (ISENTRESS) 400 MG tablet Take 400 mg by mouth 2 times daily      clonazePAM Progress Notes by Hoa Salter, RN at 3/10/2021  1:16 PM     Author: Hoa Salter, RN Service: -- Author Type: Registered Nurse    Filed: 7/8/2021 10:26 AM Encounter Date: 3/10/2021 Status: Signed    : Hoa Salter RN (Registered Nurse)       2/2/21 Colpo ECC cervicitis, bx LSIL. Plan: cotest in 1 year (scanned records)        (KLONOPIN) 2 MG tablet Take 2 mg by mouth in the morning and at bedtime.       hydroCHLOROthiazide (HYDRODIURIL) 25 MG tablet Take 25 mg by mouth daily      metFORMIN (GLUCOPHAGE-XR) 500 MG extended release tablet Take 1,000 mg by mouth 2 times daily      tadalafil (CIALIS) 5 MG tablet Take 5 mg by mouth as needed for Erectile Dysfunction      amoxicillin-clavulanate (AUGMENTIN) 875-125 MG per tablet Take 1 tablet by mouth 2 times daily Indications: Upper Respiratory Tract Infection      ondansetron (ZOFRAN-ODT) 4 MG disintegrating tablet Take 4 mg by mouth every 8 hours as needed for Nausea or Vomiting      predniSONE (DELTASONE) 10 MG tablet Take by mouth Take 40mg by mouth daily for 3 days, then take 30mg by mouth daily for 3 days, then take 20mg by mouth daily for 3 days, then take 10mg by mouth daily for 3 days       Current Facility-Administered Medications   Medication Dose Route Frequency Provider Last Rate Last Admin    raltegravir (ISENTRESS) tablet 400 mg  400 mg Oral BID Kar ELENA Mirza DO        emtricitabine (EMTRIVA) capsule 200 mg  200 mg Oral Daily Kar ELENA Mirza DO        And    Tenofovir Alafenamide Fumarate TABS 25 mg  25 mg Oral Daily Kar  DO Yuki        mometasone-formoterol (DULERA) 200-5 MCG/ACT inhaler 2 puff  2 puff Inhalation BID Maxine Ren MD   2 puff at 11/17/22 1226    [START ON 11/18/2022] azithromycin (ZITHROMAX) 250 mg in dextrose 5 % 250 mL IVPB  250 mg IntraVENous Q24H Maxine Ren MD        0.9 % sodium chloride infusion   IntraVENous PRN Cordelia Perez MD        sodium chloride flush 0.9 % injection 10 mL  10 mL IntraVENous 2 times per day Colleen Michelle MD   10 mL at 11/17/22 0904    sodium chloride flush 0.9 % injection 10 mL  10 mL IntraVENous PRN Colleen Michelle MD        0.9 % sodium chloride infusion   IntraVENous PRN Colleen Michelle MD        promethazine (PHENERGAN) tablet 12.5 mg  12.5 mg Oral Q6H PRN MD Wes Harris ondansetron (ZOFRAN) injection 4 mg  4 mg IntraVENous Q6H PRN Cleo Allen MD   4 mg at 11/17/22 0845    polyethylene glycol (GLYCOLAX) packet 17 g  17 g Oral Daily PRN Cleo Allen MD        acetaminophen (TYLENOL) tablet 650 mg  650 mg Oral Q6H PRN Cleo Allen MD        Or    acetaminophen (TYLENOL) suppository 650 mg  650 mg Rectal Q6H PRN Cleo Allen MD        albuterol (PROVENTIL) nebulizer solution 2.5 mg  2.5 mg Nebulization Q4H PRN Cleo Allen MD        0.9 % sodium chloride infusion   IntraVENous Continuous Lily Pacheco  mL/hr at 11/17/22 1300 Rate Verify at 11/17/22 1300    pantoprazole (PROTONIX) injection 40 mg  40 mg IntraVENous Daily Rickey Hurt Jr., DO   40 mg at 11/17/22 2922    cefTRIAXone (ROCEPHIN) 2,000 mg in dextrose 5 % 50 mL IVPB mini-bag  2,000 mg IntraVENous Q24H Justo Gr[facility]   Stopped at 11/16/22 2129    albuterol sulfate HFA (PROVENTIL;VENTOLIN;PROAIR) 108 (90 Base) MCG/ACT inhaler 2 puff  2 puff Inhalation Q6H PRN Cleo Allen MD        clonazePAM (KLONOPIN) tablet 2 mg  2 mg Oral BID Cleo Allen MD   2 mg at 11/16/22 2054    QUEtiapine (SEROQUEL XR) extended release tablet 800 mg  800 mg Oral Nightly Cleo Allen MD   800 mg at 11/16/22 2133    glucose chewable tablet 16 g  4 tablet Oral PRN Cleo Allen MD        dextrose bolus 10% 125 mL  125 mL IntraVENous PRN Cleo Allen MD        Or    dextrose bolus 10% 250 mL  250 mL IntraVENous PRN Cleo Allen MD        glucagon (rDNA) injection 1 mg  1 mg SubCUTAneous PRN Cleo Allen MD        dextrose 10 % infusion   IntraVENous Continuous PRN Cleo Allen MD        insulin lispro (HUMALOG) injection vial 0-8 Units  0-8 Units SubCUTAneous TID WC Cleo Allen MD   2 Units at 11/17/22 1312    insulin lispro (HUMALOG) injection vial 0-4 Units  0-4 Units SubCUTAneous Nightly Kaelyn Hawkins MD        albuterol-ipratropium (COMBIVENT RESPIMAT)  MCG/ACT inhaler 1 puff  1 puff Inhalation VISHNU Kitchen MD   1 puff at 22 1227     Vital Signs (Current)   Vitals:    22 1000 22 1100 22 1200 22 1316   BP: 110/64 106/69 112/65 112/65   Pulse: (!) 113 (!) 118 (!) 118 (!) 120   Resp:    Temp:    97.6 °F (36.4 °C)   TempSrc:    Oral   SpO2: 96% 93% 97% 98%   Weight:    253 lb (114.8 kg)   Height:    6' 1\" (1.854 m)                                            Vital Signs Statistics (for past 48 hrs)     Temp  Av.7 °F (37.1 °C)  Min: 97.1 °F (36.2 °C)   Min taken time: 22 1315  Max: 101.3 °F (38.5 °C)   Max taken time: 22 2030  Pulse  Av.6  Min: 109   Min taken time: 22 0030  Max: 150   Max taken time: 22 1315  Resp  Av.9  Min: 15   Min taken time: 22 1630  Max: 28   Max taken time: 22 1315  BP  Min: 69/57   Min taken time: 22 1530  Max: 124/90   Max taken time: 22 1445  MAP (mmHg)  Av.3  Min: 64   Min taken time: 22 1515  Max: 98   Max taken time: 22 1445  SpO2  Av.1 %  Min: 92 %   Min taken time: 22 2230  Max: 100 %   Max taken time: 22 1930  BP Readings from Last 3 Encounters:   22 112/65       BMI  Body mass index is 33.38 kg/m². Estimated body mass index is 33.38 kg/m² as calculated from the following:    Height as of this encounter: 6' 1\" (1.854 m). Weight as of this encounter: 253 lb (114.8 kg).     CBC   Lab Results   Component Value Date/Time    WBC 9.7 2022 04:58 AM    RBC 3.19 2022 04:58 AM    HGB 9.5 2022 12:04 PM    HCT 28.4 2022 12:04 PM    MCV 90.5 2022 04:58 AM    RDW 15.3 2022 04:58 AM    PLT 70 2022 04:58 AM     CMP    Lab Results   Component Value Date/Time     2022 04:58 AM    K 3.9 2022 04:58 AM    K 3.9 2022 04:58 AM    CL 94 2022 04:58 AM    CO2 20 2022 04:58 AM    BUN 36 2022 04:58 AM    CREATININE 1.7 2022 04:58 AM    AGRATIO 0.9 2022 04:58 AM LABGLOM 47 11/17/2022 04:58 AM    GLUCOSE 202 11/17/2022 04:58 AM    PROT 5.0 11/17/2022 04:58 AM    CALCIUM 7.2 11/17/2022 04:58 AM    BILITOT 1.3 11/17/2022 04:58 AM    ALKPHOS 62 11/17/2022 04:58 AM    AST 2,172 11/17/2022 04:58 AM    ALT 1,223 11/17/2022 04:58 AM     BMP    Lab Results   Component Value Date/Time     11/17/2022 04:58 AM    K 3.9 11/17/2022 04:58 AM    K 3.9 11/17/2022 04:58 AM    CL 94 11/17/2022 04:58 AM    CO2 20 11/17/2022 04:58 AM    BUN 36 11/17/2022 04:58 AM    CREATININE 1.7 11/17/2022 04:58 AM    CALCIUM 7.2 11/17/2022 04:58 AM    LABGLOM 47 11/17/2022 04:58 AM    GLUCOSE 202 11/17/2022 04:58 AM     POCGlucose  Recent Labs     11/16/22  1315 11/17/22  0458   GLUCOSE 347* 202*      Coags    Lab Results   Component Value Date/Time    PROTIME 14.9 11/16/2022 01:16 PM    INR 1.18 11/16/2022 01:16 PM     HCG (If Applicable) No results found for: PREGTESTUR, PREGSERUM, HCG, HCGQUANT   ABGs   Lab Results   Component Value Date/Time    PHART 7.419 11/17/2022 12:26 PM    PO2ART 80.1 11/17/2022 12:26 PM    NTY8IFX 31.9 11/17/2022 12:26 PM    BDP8KPT 20.6 11/17/2022 12:26 PM    BEART -3.2 11/17/2022 12:26 PM    Y0NQLGTW 96.8 11/17/2022 12:26 PM      Type & Screen (If Applicable)  No results found for: LABABO, LABRH                         BMI: Wt Readings from Last 3 Encounters:       NPO Status:   Date of last liquid consumption: 11/17/22   Time of last liquid consumption: 0710   Date of last solid food consumption: 11/13/22 (per patient 4 days ago)      Time of last solid consumption: 2000       Anesthesia Evaluation  Patient summary reviewed no history of anesthetic complications:   Airway: Mallampati: II  TM distance: >3 FB   Neck ROM: full  Mouth opening: > = 3 FB   Dental: normal exam         Pulmonary: breath sounds clear to auscultation      (-) COPD, asthma and not a current smoker                           Cardiovascular:    (+) hypertension:, hyperlipidemia    (-) past MI, CABG/stent and  angina        Rate: normal                    Neuro/Psych:      (-) seizures, TIA and CVA           GI/Hepatic/Renal:   (+) liver disease:, renal disease: CRI,      (-) GERD       Endo/Other:    (+) Diabetes, blood dyscrasia: anemia:., malignancy/cancer. ROS comment: HIV+ Abdominal:             Vascular:     - DVT and PE. Other Findings:           Anesthesia Plan      MAC     ASA 3       Induction: intravenous. Anesthetic plan and risks discussed with patient. Plan discussed with CRNA. This pre-anesthesia assessment may be used as a history and physical.    DOS STAFF ADDENDUM:    Pt seen and examined, chart reviewed (including anesthesia, drug and allergy history). No interval changes to history and physical examination. Anesthetic plan, risks, benefits, alternatives, and personnel involved discussed with patient. Patient verbalized an understanding and agrees to proceed.       Tanisha Jordan MD  November 17, 2022  1:46 PM

## 2022-12-19 ENCOUNTER — MYC REFILL (OUTPATIENT)
Dept: INTERNAL MEDICINE | Facility: CLINIC | Age: 32
End: 2022-12-19

## 2022-12-19 DIAGNOSIS — F90.2 ATTENTION DEFICIT HYPERACTIVITY DISORDER (ADHD), COMBINED TYPE: ICD-10-CM

## 2022-12-19 RX ORDER — DEXTROAMPHETAMINE SACCHARATE, AMPHETAMINE ASPARTATE, DEXTROAMPHETAMINE SULFATE AND AMPHETAMINE SULFATE 2.5; 2.5; 2.5; 2.5 MG/1; MG/1; MG/1; MG/1
10 TABLET ORAL 2 TIMES DAILY
Qty: 60 TABLET | Refills: 0 | Status: SHIPPED | OUTPATIENT
Start: 2022-12-19 | End: 2023-01-17

## 2022-12-20 RX ORDER — DEXTROAMPHETAMINE SACCHARATE, AMPHETAMINE ASPARTATE, DEXTROAMPHETAMINE SULFATE AND AMPHETAMINE SULFATE 2.5; 2.5; 2.5; 2.5 MG/1; MG/1; MG/1; MG/1
10 TABLET ORAL 2 TIMES DAILY
Qty: 60 TABLET | Refills: 0 | Status: SHIPPED | OUTPATIENT
Start: 2022-12-20 | End: 2023-12-18

## 2022-12-26 NOTE — PROGRESS NOTES
[facility] Follow Up Note    Assessment/Plan:  1. Generalized anxiety disorder  Stable on current medications.  No alcohol use-greater than 10 months.  Exercising regularly.  Recommendation: Continue current medications    2. Attention deficit hyperactivity disorder (ADHD), combined type  Using sparingly and only when taking classes.  We will continue the same.  No urine tox today as she did not use her medications today    3. Cervical high risk HPV (human papillomavirus) test positive  She has completed colposcopy.  Her last Pap smear in January was negative but she is HPV positive.  We will proceed with HPV immunization.  Pap smear in 6 months along with her physical          Aicha Ramon MD, MD    Chief Complaint:  Chief Complaint   Patient presents with     Follow Up       History of Present Illness:  Layne is a 30 year old female who is here today for follow-up of her usual medical problems.  Of note, she had been dealing with generalized anxiety disorder in the setting of alcohol overuse.  Since last visit, she has not used any alcohol.  She is currently not sexually active.  She is working with a .  She is taking classes.  She works as a nanny.    She is going to be checking in with her therapist with regard to some family discord.  She has 8 siblings.    She is using her Adderall judiciously.  She uses it when she is in school.  She is not requesting monthly refills.    Review of Systems:  A comprehensive review of systems was performed and was otherwise negative    PFSH:  Social History: Single.  History   Smoking Status     Current Some Day Smoker   Smokeless Tobacco     Current User       Past History:   Current Outpatient Medications   Medication Sig Dispense Refill     chlorophyll 100 MG TABS tablet Take 100 mg by mouth daily       citalopram (CELEXA) 20 MG tablet [CITALOPRAM (CELEXA) 20 MG TABLET] Take 1 tablet (20 mg total) by mouth daily. 90 tablet 3      "dextroamphetamine-amphetamine (ADDERALL) 10 mg Tab tablet [DEXTROAMPHETAMINE-AMPHETAMINE (ADDERALL) 10 MG TAB TABLET] Take 1 tablet by mouth 2 (two) times a day. 60 tablet 0     fexofenadine (ALLEGRA) 180 MG tablet [FEXOFENADINE (ALLEGRA) 180 MG TABLET] Take 180 mg by mouth daily.       levonorgestrel (MIRENA) 20 mcg/24 hr (5 years) IUD [LEVONORGESTREL (MIRENA) 20 MCG/24 HR (5 YEARS) IUD] 1 each by Intrauterine route once. Mirena IUD Inserted by Sarah Hook CNM on 1-6-16   (Due for removal by 1-6-2021)  [employer]  Lot [id number]  Exp 04/18  NDC 88317-839-43       melatonin 5 MG tablet Take 5 mg by mouth nightly as needed for sleep       Vitamin D, Cholecalciferol, 25 MCG (1000 UT) CAPS          Family History:     Physical Exam:  General Appearance:   Peers well and in no acute distress  Vitals:    07/13/21 0726   BP: 122/66   BP Location: Left arm   Patient Position: Sitting   Cuff Size: Adult Regular   Pulse: 74   Resp: 16   SpO2: 99%   Weight: 79.7 kg (175 lb 11.2 oz)   Height: 1.632 m (5' 4.25\")     Wt Readings from Last 3 Encounters:   07/13/21 79.7 kg (175 lb 11.2 oz)   01/12/21 82.3 kg (181 lb 7 oz)   09/20/19 80.7 kg (178 lb)     Body mass index is 29.92 kg/m .        " No

## 2023-01-09 ENCOUNTER — OFFICE VISIT (OUTPATIENT)
Dept: INTERNAL MEDICINE | Facility: CLINIC | Age: 33
End: 2023-01-09
Payer: COMMERCIAL

## 2023-01-09 VITALS
TEMPERATURE: 99 F | OXYGEN SATURATION: 96 % | BODY MASS INDEX: 25.95 KG/M2 | DIASTOLIC BLOOD PRESSURE: 61 MMHG | WEIGHT: 152 LBS | HEIGHT: 64 IN | HEART RATE: 88 BPM | RESPIRATION RATE: 18 BRPM | SYSTOLIC BLOOD PRESSURE: 103 MMHG

## 2023-01-09 DIAGNOSIS — Z23 HIGH PRIORITY FOR 2019-NCOV VACCINE: ICD-10-CM

## 2023-01-09 DIAGNOSIS — F41.1 GENERALIZED ANXIETY DISORDER: ICD-10-CM

## 2023-01-09 DIAGNOSIS — F90.2 ATTENTION DEFICIT HYPERACTIVITY DISORDER (ADHD), COMBINED TYPE: ICD-10-CM

## 2023-01-09 DIAGNOSIS — L30.9 DERMATITIS: Primary | ICD-10-CM

## 2023-01-09 LAB
AMPHETAMINES UR QL SCN: ABNORMAL
BARBITURATES UR QL SCN: ABNORMAL
BENZODIAZ UR QL SCN: ABNORMAL
BZE UR QL SCN: ABNORMAL
CANNABINOIDS UR QL SCN: ABNORMAL
CREAT UR-MCNC: 107 MG/DL
OPIATES UR QL SCN: ABNORMAL
OXYCODONE UR QL: ABNORMAL
PCP QUAL URINE (ROCHE): ABNORMAL

## 2023-01-09 PROCEDURE — 91313 COVID-19 VACCINE BIVALENT BOOSTER 18+ (MODERNA): CPT | Performed by: INTERNAL MEDICINE

## 2023-01-09 PROCEDURE — 96127 BRIEF EMOTIONAL/BEHAV ASSMT: CPT | Performed by: INTERNAL MEDICINE

## 2023-01-09 PROCEDURE — 90471 IMMUNIZATION ADMIN: CPT | Performed by: INTERNAL MEDICINE

## 2023-01-09 PROCEDURE — 0134A COVID-19 VACCINE BIVALENT BOOSTER 18+ (MODERNA): CPT | Performed by: INTERNAL MEDICINE

## 2023-01-09 PROCEDURE — 80307 DRUG TEST PRSMV CHEM ANLYZR: CPT | Performed by: INTERNAL MEDICINE

## 2023-01-09 PROCEDURE — 99214 OFFICE O/P EST MOD 30 MIN: CPT | Mod: 25 | Performed by: INTERNAL MEDICINE

## 2023-01-09 PROCEDURE — 90686 IIV4 VACC NO PRSV 0.5 ML IM: CPT | Performed by: INTERNAL MEDICINE

## 2023-01-09 RX ORDER — TRIAMCINOLONE ACETONIDE 0.25 MG/G
OINTMENT TOPICAL 2 TIMES DAILY
Qty: 80 G | Refills: 0 | Status: SHIPPED | OUTPATIENT
Start: 2023-01-09 | End: 2023-10-08

## 2023-01-09 ASSESSMENT — PATIENT HEALTH QUESTIONNAIRE - PHQ9
10. IF YOU CHECKED OFF ANY PROBLEMS, HOW DIFFICULT HAVE THESE PROBLEMS MADE IT FOR YOU TO DO YOUR WORK, TAKE CARE OF THINGS AT HOME, OR GET ALONG WITH OTHER PEOPLE: NOT DIFFICULT AT ALL
SUM OF ALL RESPONSES TO PHQ QUESTIONS 1-9: 0
SUM OF ALL RESPONSES TO PHQ QUESTIONS 1-9: 0

## 2023-01-09 NOTE — PROGRESS NOTES
"  Assessment & Plan     Dermatitis around her mouth  Itchiness is most likely due to local irritation, topical reaction.  Discussed to stop using oil on it and try low potency steroid cream.  Discussed to only use it regularly for 1 week and then take a week off.  If that is not helpful she will see dermatologist.  She might have propensity to rosacea as well.  - triamcinolone (KENALOG) 0.025 % external ointment; Apply topically 2 times daily Apply to affected area twicea day for 1 week, then take one week off, may repeat.  - Adult Dermatology Referral; Future    Attention deficit hyperactivity disorder (ADHD), combined type  She signed controlled substance agreement and urine drug screen obtained today.  She will be due for a refill in the next 1-2 weeks.  - Urine Drugs of Abuse Screen Panel 1 - Drug Screen (Full)    Generalized anxiety disorder  Well-controlled on citalopram    High priority for 2019-nCoV vaccine  - COVID-19,PF,MODERNA BIVALENT 18+Yrs  56}     BMI:   Estimated body mass index is 26.09 kg/m  as calculated from the following:    Height as of this encounter: 1.626 m (5' 4\").    Weight as of this encounter: 68.9 kg (152 lb).     Return in about 6 months (around 7/9/2023).    Sheeba Booth MD  [facility]    Subjective   Layne is a 32 year old accompanied by her self, presenting for the following health issues:  Establish Care (Establish care and review medication list. Vaccines due. Rash on right side of face for approximately one week./) and Recheck Medication    Layne is a 22-year-old female with history of anxiety and depression, ADD, IUD, family history of clots.  She is currently here to get medication refills and discuss rash around her mouth.    Generally she does tend to have skin flushing around her cheeks on a chronic basis.  Most recently she developed more of a bumpy itchy rash but only on the right side around her mouth and slightly under her nose.  " "She denies any new routine.  Topically she has been using fur oil.  She denies using any ascribes on her face.  Rash is nonpainful.    She does have history of ADD and currently takes Adderall.  She takes it once to twice a day and sometimes skips it on the weekend especially when she is not in school loss dieting.  Her last dose of Adderall was this morning.  Her blood pressure is well controlled.    She does see [facility] for IUD.  Pap smear.  Her last Pap smear on the chart was in 2021 which was positive for HPV.  She reports that she did have Pap smear last year.  Discussed that she will need to get 1 more normal this year and she will schedule a follow-up with them.    Family history significant for thyroid disorder, dad had skin cancer, lung cancer and diabetes, 3 sisters have had clots during pregnancy and required heparin shots to prevent miscarriages.  Patient has never personally had history of clots.    She works as a teacher for .    History of Present Illness       Reason for visit:  General check up, meet new doctor    She eats 4 or more servings of fruits and vegetables daily.She consumes 0 sweetened beverage(s) daily.She exercises with enough effort to increase her heart rate 30 to 60 minutes per day.  She exercises with enough effort to increase her heart rate 4 days per week.   She is taking medications regularly.    Today's PHQ-9         PHQ-9 Total Score: 0    PHQ-9 Q9 Thoughts of better off dead/self-harm past 2 weeks :   Not at all    How difficult have these problems made it for you to do your work, take care of things at home, or get along with other people: Not difficult at all       Review of Systems   As above      Objective    /61 (BP Location: Left arm, Patient Position: Sitting, Cuff Size: Adult Regular)   Pulse 88   Temp 99  F (37.2  C) (Tympanic)   Resp 18   Ht 1.626 m (5' 4\")   Wt 68.9 kg (152 lb)   SpO2 96%   BMI 26.09 kg/m    Body mass index is 26.09 " kg/m .  Physical Exam   General: well appearing female, alert and oriented x3  EYES: Eyelids, conjunctiva, and sclera were normal. Pupils were normal.   HEAD, EARS, NOSE, MOUTH, AND THROAT: no cervical LAD, no thyromegaly or nodules appreciated. TMs are visualized and normal, oropharynx is clear.  RESPIRATORY: respirations non labored, CTA bl, no wheezes, rales, no forced expiratory wheezing.  CARDIOVASCULAR: Heart rate and rhythm were normal. No murmurs, rubs,gallops. There was no peripheral edema.  GASTROINTESTINAL: Positive bowel sounds, abdomen is soft, non tender, non distended.     MUSCULOSKELETAL: Muscle mass was normal for age. No joint synovitis or deformity.  LYMPHATIC: There were no enlarged nodes palpable.  SKIN/HAIR/NAILS: Skin color was normal.  No rashes.  NEUROLOGIC: The patient was alert and oriented.  Speech was normal.  There is no facial asymmetry.   PSYCHIATRIC:  Mood and affect were normal.

## 2023-01-09 NOTE — LETTER
[facility]  01/09/23  Patient: Layne Tariq  YOB: 1990  Medical Record Number: [id number]                                                                                  Non-Opioid Controlled Substance Agreement    This is an agreement between you and your provider regarding safe and appropriate use of controlled substances prescribed by your care team. Controlled substances are?medicines that can cause physical and mental dependence (abuse).     There are strict laws about having and using these medicines. We here at [facility] are  committed to working with you in your efforts to get better. To support you in this work, we'll help you schedule regular office appointments for medicine refills. If we must cancel or change your appointment for any reason, we'll make sure you have enough medicine to last until your next appointment.     As a Provider, I will:     Listen carefully to your concerns while treating you with respect.     Recommend a treatment plan that I believe is in your best interest and may involve therapies other than medicine.      Talk with you often about the possible benefits and the risk of harm of any medicine that we prescribe for you.    Assess the safety of this medicine and check how well it works.      Provide a plan on how to taper (discontinue or go off) using this medicine if the decision is made to stop its use.      ::  As a Patient, I understand controlled substances:       Are prescribed by my care provider to help me function or work and manage my condition(s).?    Are strong medicines and can cause serious side effects.       Need to be taken exactly as prescribed.?Combining controlled substances with certain medicines or chemicals (such as illegal drugs, alcohol, sedatives, sleeping pills, and benzodiazepines) can be dangerous or even fatal.? If I stop taking my medicines suddenly, I may have severe withdrawal symptoms.     The  risks, benefits, and side effects of these medicine(s) were explained to me. I agree that:    1. I will take part in other treatments as advised by my care team. This may be psychiatry or counseling, physical therapy, behavioral therapy, group treatment or a referral to specialist.    2. I will keep all my appointments and understand this is part of the monitoring of controlled substances.?My care team may require an office visit for EVERY controlled substance refill. If I miss appointments or don t follow instructions, my care team may stop my medicine    3. I will take my medicines as prescribed. I will not change the dose or schedule unless my care team tells me to. There will be no refills if I run out early.      4. I may be asked to come to the clinic and complete a urine drug test or complete a pill count. If I don t give a urine sample or participate in a pill count, the care team may stop my medicine.    5. I will only receive controlled substance prescriptions from this clinic. If I am treated by another provider, I will tell them that I am taking controlled substances and that I have a treatment agreement with this provider. I will inform my [facility] care team within one business day if I am given a prescription for any controlled substance by another healthcare provider. My [facility] care team can contact other providers and pharmacists about my use of any medicines.    6. It is up to me to make sure that I don't run out of my medicines on weekends or holidays.?If my care team is willing to refill my prescription without a visit, I must request refills only during office hours. Refills may take up to 3 business days to process. I will use one pharmacy to fill all my controlled substance prescriptions. I will notify the clinic about any changes to my insurance or medicine availability.    7. I am responsible for my prescriptions. If the medicine/prescription is lost, stolen or destroyed,  it will not be replaced.?I also agree not to share controlled substance medicines with anyone.     8. I am aware I should not use any illegal or recreational drugs. I agree not to drink alcohol unless my care team says I can.     9. If I enroll in the [place] Medical Cannabis program, I will tell my care team before my next refill.    10. I will tell my care team right away if I become pregnant, have a new medical problem treated outside of my regular clinic, or have a change in my medicines.     11. I understand that this medicine can affect my thinking, judgment and reaction time.? Alcohol and drugs affect the brain and body, which can affect the safety of my driving. Being under the influence of alcohol or drugs can affect my decision-making, behaviors, personal safety and the safety of others. Driving while impaired (DWI) can occur if a person is driving, operating or in physical control of a car, motorcycle, boat, snowmobile, ATV, motorbike, off-road vehicle or any other motor vehicle ([place] Statute 169A.20). I understand the risk if I choose to drive or operate any vehicle or machinery.    I understand that if I do not follow any of the conditions above, my prescriptions or treatment may be stopped or changed.   I agree that my provider, clinic care team and pharmacy may work with any city, state or federal law enforcement agency that investigates the misuse, sale or other diversion of my controlled medicine. I will allow my provider to discuss my care with, or share a copy of, this agreement with any other treating provider, pharmacy or emergency room where I receive care.     I have read this agreement and have asked questions about anything I did not understand.    ________________________________________________________  Patient Signature - Layne Tariq     ___________________                   Date     ________________________________________________________  Provider Signature - Sheeba  Horchner, MD       ___________________                   Date     ________________________________________________________  Witness Signature (required if provider not present while patient signing)          ___________________                   Date

## 2023-01-17 ENCOUNTER — MYC REFILL (OUTPATIENT)
Dept: INTERNAL MEDICINE | Facility: CLINIC | Age: 33
End: 2023-01-17
Payer: COMMERCIAL

## 2023-01-17 DIAGNOSIS — F90.2 ATTENTION DEFICIT HYPERACTIVITY DISORDER (ADHD), COMBINED TYPE: ICD-10-CM

## 2023-01-17 RX ORDER — DEXTROAMPHETAMINE SACCHARATE, AMPHETAMINE ASPARTATE, DEXTROAMPHETAMINE SULFATE AND AMPHETAMINE SULFATE 2.5; 2.5; 2.5; 2.5 MG/1; MG/1; MG/1; MG/1
10 TABLET ORAL 2 TIMES DAILY
Qty: 60 TABLET | Refills: 0 | Status: SHIPPED | OUTPATIENT
Start: 2023-01-17 | End: 2023-02-10

## 2023-02-10 ENCOUNTER — MYC REFILL (OUTPATIENT)
Dept: INTERNAL MEDICINE | Facility: CLINIC | Age: 33
End: 2023-02-10
Payer: COMMERCIAL

## 2023-02-10 DIAGNOSIS — F90.2 ATTENTION DEFICIT HYPERACTIVITY DISORDER (ADHD), COMBINED TYPE: ICD-10-CM

## 2023-02-10 RX ORDER — DEXTROAMPHETAMINE SACCHARATE, AMPHETAMINE ASPARTATE, DEXTROAMPHETAMINE SULFATE AND AMPHETAMINE SULFATE 2.5; 2.5; 2.5; 2.5 MG/1; MG/1; MG/1; MG/1
10 TABLET ORAL 2 TIMES DAILY
Qty: 60 TABLET | Refills: 0 | Status: SHIPPED | OUTPATIENT
Start: 2023-02-10 | End: 2023-03-20

## 2023-02-10 NOTE — TELEPHONE ENCOUNTER
Cancel old order for [facility] does not have any in stock. Please resend to [facility]. [id number] - [place], [place] - [address]  [phone]

## 2023-02-12 RX ORDER — DEXTROAMPHETAMINE SACCHARATE, AMPHETAMINE ASPARTATE, DEXTROAMPHETAMINE SULFATE AND AMPHETAMINE SULFATE 2.5; 2.5; 2.5; 2.5 MG/1; MG/1; MG/1; MG/1
10 TABLET ORAL 2 TIMES DAILY
Qty: 60 TABLET | Refills: 0 | OUTPATIENT
Start: 2023-02-12

## 2023-03-20 ENCOUNTER — MYC REFILL (OUTPATIENT)
Dept: INTERNAL MEDICINE | Facility: CLINIC | Age: 33
End: 2023-03-20
Payer: COMMERCIAL

## 2023-03-20 DIAGNOSIS — F90.2 ATTENTION DEFICIT HYPERACTIVITY DISORDER (ADHD), COMBINED TYPE: ICD-10-CM

## 2023-03-20 RX ORDER — DEXTROAMPHETAMINE SACCHARATE, AMPHETAMINE ASPARTATE, DEXTROAMPHETAMINE SULFATE AND AMPHETAMINE SULFATE 2.5; 2.5; 2.5; 2.5 MG/1; MG/1; MG/1; MG/1
10 TABLET ORAL 2 TIMES DAILY
Qty: 60 TABLET | Refills: 0 | Status: SHIPPED | OUTPATIENT
Start: 2023-03-20 | End: 2023-04-24

## 2023-04-24 ENCOUNTER — MYC REFILL (OUTPATIENT)
Dept: INTERNAL MEDICINE | Facility: CLINIC | Age: 33
End: 2023-04-24
Payer: COMMERCIAL

## 2023-04-24 DIAGNOSIS — F90.2 ATTENTION DEFICIT HYPERACTIVITY DISORDER (ADHD), COMBINED TYPE: ICD-10-CM

## 2023-04-26 RX ORDER — DEXTROAMPHETAMINE SACCHARATE, AMPHETAMINE ASPARTATE, DEXTROAMPHETAMINE SULFATE AND AMPHETAMINE SULFATE 2.5; 2.5; 2.5; 2.5 MG/1; MG/1; MG/1; MG/1
10 TABLET ORAL 2 TIMES DAILY
Qty: 60 TABLET | Refills: 0 | Status: SHIPPED | OUTPATIENT
Start: 2023-04-26 | End: 2023-05-29

## 2023-04-26 NOTE — TELEPHONE ENCOUNTER
Routing refill request to provider for review/approval because:  Drug not on the [facility] [place]     Last Written Prescription Date:  3/20/2023  Last Fill Quantity: 60,  # refills: 0   Last office visit provider:  1/9/2023     Requested Prescriptions   Pending Prescriptions Disp Refills     amphetamine-dextroamphetamine (ADDERALL) 10 MG tablet 60 tablet 0     Sig: Take 1 tablet (10 mg) by mouth 2 times daily       There is no refill protocol information for this order          Medina Lanza RN 04/26/23 7:26 AM

## 2023-05-01 ENCOUNTER — MYC REFILL (OUTPATIENT)
Dept: INTERNAL MEDICINE | Facility: CLINIC | Age: 33
End: 2023-05-01
Payer: COMMERCIAL

## 2023-05-01 DIAGNOSIS — F90.2 ATTENTION DEFICIT HYPERACTIVITY DISORDER (ADHD), COMBINED TYPE: ICD-10-CM

## 2023-05-01 RX ORDER — DEXTROAMPHETAMINE SACCHARATE, AMPHETAMINE ASPARTATE, DEXTROAMPHETAMINE SULFATE AND AMPHETAMINE SULFATE 2.5; 2.5; 2.5; 2.5 MG/1; MG/1; MG/1; MG/1
10 TABLET ORAL 2 TIMES DAILY
Qty: 60 TABLET | Refills: 0 | Status: CANCELLED | OUTPATIENT
Start: 2023-05-01

## 2023-05-13 ENCOUNTER — HEALTH MAINTENANCE LETTER (OUTPATIENT)
Age: 33
End: 2023-05-13

## 2023-05-29 ENCOUNTER — MYC REFILL (OUTPATIENT)
Dept: INTERNAL MEDICINE | Facility: CLINIC | Age: 33
End: 2023-05-29
Payer: COMMERCIAL

## 2023-05-29 DIAGNOSIS — F90.2 ATTENTION DEFICIT HYPERACTIVITY DISORDER (ADHD), COMBINED TYPE: ICD-10-CM

## 2023-05-30 RX ORDER — DEXTROAMPHETAMINE SACCHARATE, AMPHETAMINE ASPARTATE, DEXTROAMPHETAMINE SULFATE AND AMPHETAMINE SULFATE 2.5; 2.5; 2.5; 2.5 MG/1; MG/1; MG/1; MG/1
10 TABLET ORAL 2 TIMES DAILY
Qty: 60 TABLET | Refills: 0 | Status: SHIPPED | OUTPATIENT
Start: 2023-05-30 | End: 2023-07-03

## 2023-07-03 ENCOUNTER — MYC REFILL (OUTPATIENT)
Dept: INTERNAL MEDICINE | Facility: CLINIC | Age: 33
End: 2023-07-03
Payer: COMMERCIAL

## 2023-07-03 DIAGNOSIS — F90.2 ATTENTION DEFICIT HYPERACTIVITY DISORDER (ADHD), COMBINED TYPE: ICD-10-CM

## 2023-07-03 RX ORDER — DEXTROAMPHETAMINE SACCHARATE, AMPHETAMINE ASPARTATE, DEXTROAMPHETAMINE SULFATE AND AMPHETAMINE SULFATE 2.5; 2.5; 2.5; 2.5 MG/1; MG/1; MG/1; MG/1
10 TABLET ORAL 2 TIMES DAILY
Qty: 60 TABLET | Refills: 0 | Status: SHIPPED | OUTPATIENT
Start: 2023-07-03 | End: 2023-08-01

## 2023-08-01 ENCOUNTER — MYC REFILL (OUTPATIENT)
Dept: INTERNAL MEDICINE | Facility: CLINIC | Age: 33
End: 2023-08-01
Payer: COMMERCIAL

## 2023-08-01 DIAGNOSIS — F90.2 ATTENTION DEFICIT HYPERACTIVITY DISORDER (ADHD), COMBINED TYPE: ICD-10-CM

## 2023-08-02 RX ORDER — DEXTROAMPHETAMINE SACCHARATE, AMPHETAMINE ASPARTATE, DEXTROAMPHETAMINE SULFATE AND AMPHETAMINE SULFATE 2.5; 2.5; 2.5; 2.5 MG/1; MG/1; MG/1; MG/1
10 TABLET ORAL 2 TIMES DAILY
Qty: 60 TABLET | Refills: 0 | Status: SHIPPED | OUTPATIENT
Start: 2023-08-02 | End: 2023-08-29

## 2023-08-29 ENCOUNTER — MYC REFILL (OUTPATIENT)
Dept: INTERNAL MEDICINE | Facility: CLINIC | Age: 33
End: 2023-08-29
Payer: COMMERCIAL

## 2023-08-29 DIAGNOSIS — F90.2 ATTENTION DEFICIT HYPERACTIVITY DISORDER (ADHD), COMBINED TYPE: ICD-10-CM

## 2023-08-30 RX ORDER — DEXTROAMPHETAMINE SACCHARATE, AMPHETAMINE ASPARTATE, DEXTROAMPHETAMINE SULFATE AND AMPHETAMINE SULFATE 2.5; 2.5; 2.5; 2.5 MG/1; MG/1; MG/1; MG/1
10 TABLET ORAL 2 TIMES DAILY
Qty: 60 TABLET | Refills: 0 | Status: SHIPPED | OUTPATIENT
Start: 2023-08-30 | End: 2023-10-08

## 2023-08-30 NOTE — TELEPHONE ENCOUNTER
Routing refill request to provider for review/approval because:  Drug not on the [facility] refill protocol - Controlled Substance Request.     Requested Prescriptions   Pending Prescriptions Disp Refills    amphetamine-dextroamphetamine (ADDERALL) 10 MG tablet 60 tablet 0     Sig: Take 1 tablet (10 mg) by mouth 2 times daily       There is no refill protocol information for this order          Kassandra Mcgee RN 08/30/23 6:09 AM

## 2023-10-08 ENCOUNTER — MYC REFILL (OUTPATIENT)
Dept: INTERNAL MEDICINE | Facility: CLINIC | Age: 33
End: 2023-10-08
Payer: COMMERCIAL

## 2023-10-08 DIAGNOSIS — F90.2 ATTENTION DEFICIT HYPERACTIVITY DISORDER (ADHD), COMBINED TYPE: ICD-10-CM

## 2023-10-09 RX ORDER — DEXTROAMPHETAMINE SACCHARATE, AMPHETAMINE ASPARTATE, DEXTROAMPHETAMINE SULFATE AND AMPHETAMINE SULFATE 2.5; 2.5; 2.5; 2.5 MG/1; MG/1; MG/1; MG/1
10 TABLET ORAL 2 TIMES DAILY
Qty: 60 TABLET | Refills: 0 | Status: SHIPPED | OUTPATIENT
Start: 2023-10-09 | End: 2023-11-15

## 2023-11-15 ENCOUNTER — MYC REFILL (OUTPATIENT)
Dept: INTERNAL MEDICINE | Facility: CLINIC | Age: 33
End: 2023-11-15
Payer: COMMERCIAL

## 2023-11-15 DIAGNOSIS — F90.2 ATTENTION DEFICIT HYPERACTIVITY DISORDER (ADHD), COMBINED TYPE: ICD-10-CM

## 2023-11-15 RX ORDER — DEXTROAMPHETAMINE SACCHARATE, AMPHETAMINE ASPARTATE, DEXTROAMPHETAMINE SULFATE AND AMPHETAMINE SULFATE 2.5; 2.5; 2.5; 2.5 MG/1; MG/1; MG/1; MG/1
10 TABLET ORAL 2 TIMES DAILY
Qty: 60 TABLET | Refills: 0 | Status: SHIPPED | OUTPATIENT
Start: 2023-11-15 | End: 2023-12-05

## 2023-12-04 ENCOUNTER — MYC MEDICAL ADVICE (OUTPATIENT)
Dept: INTERNAL MEDICINE | Facility: CLINIC | Age: 33
End: 2023-12-04
Payer: COMMERCIAL

## 2023-12-05 ENCOUNTER — MYC REFILL (OUTPATIENT)
Dept: INTERNAL MEDICINE | Facility: CLINIC | Age: 33
End: 2023-12-05
Payer: COMMERCIAL

## 2023-12-05 DIAGNOSIS — F90.2 ATTENTION DEFICIT HYPERACTIVITY DISORDER (ADHD), COMBINED TYPE: ICD-10-CM

## 2023-12-05 RX ORDER — DEXTROAMPHETAMINE SACCHARATE, AMPHETAMINE ASPARTATE, DEXTROAMPHETAMINE SULFATE AND AMPHETAMINE SULFATE 2.5; 2.5; 2.5; 2.5 MG/1; MG/1; MG/1; MG/1
10 TABLET ORAL 2 TIMES DAILY
Qty: 60 TABLET | Refills: 0 | Status: SHIPPED | OUTPATIENT
Start: 2023-12-05 | End: 2023-12-18

## 2023-12-06 NOTE — TELEPHONE ENCOUNTER
James on  to call back and schedule an appt for travel advice at least 2 weeks before she leaves Jan 4, 2024, which would be 12/18/2023.  That is the only day that Dr. Booth is here that week.  Please schedule her for 12:00PM or 3:30PM, approved by Leobardo.

## 2023-12-06 NOTE — TELEPHONE ENCOUNTER
I would like to see her sooner, generally would prefer to give vaccinations 2 weeks before travel and she will be leaving the country on January 4

## 2023-12-18 ENCOUNTER — OFFICE VISIT (OUTPATIENT)
Dept: INTERNAL MEDICINE | Facility: CLINIC | Age: 33
End: 2023-12-18
Payer: COMMERCIAL

## 2023-12-18 VITALS
HEART RATE: 100 BPM | RESPIRATION RATE: 17 BRPM | SYSTOLIC BLOOD PRESSURE: 104 MMHG | DIASTOLIC BLOOD PRESSURE: 62 MMHG | WEIGHT: 148.6 LBS | OXYGEN SATURATION: 100 % | HEIGHT: 64 IN | BODY MASS INDEX: 25.37 KG/M2 | TEMPERATURE: 98.7 F

## 2023-12-18 DIAGNOSIS — F90.2 ATTENTION DEFICIT HYPERACTIVITY DISORDER (ADHD), COMBINED TYPE: ICD-10-CM

## 2023-12-18 DIAGNOSIS — Z71.84 TRAVEL ADVICE ENCOUNTER: Primary | ICD-10-CM

## 2023-12-18 PROCEDURE — 36415 COLL VENOUS BLD VENIPUNCTURE: CPT | Performed by: INTERNAL MEDICINE

## 2023-12-18 PROCEDURE — 86706 HEP B SURFACE ANTIBODY: CPT | Performed by: INTERNAL MEDICINE

## 2023-12-18 PROCEDURE — 86708 HEPATITIS A ANTIBODY: CPT | Performed by: INTERNAL MEDICINE

## 2023-12-18 PROCEDURE — 99214 OFFICE O/P EST MOD 30 MIN: CPT | Mod: 25 | Performed by: INTERNAL MEDICINE

## 2023-12-18 PROCEDURE — 90675 RABIES VACCINE IM: CPT | Performed by: INTERNAL MEDICINE

## 2023-12-18 PROCEDURE — 90472 IMMUNIZATION ADMIN EACH ADD: CPT | Performed by: INTERNAL MEDICINE

## 2023-12-18 PROCEDURE — 90691 TYPHOID VACCINE IM: CPT | Performed by: INTERNAL MEDICINE

## 2023-12-18 PROCEDURE — 90471 IMMUNIZATION ADMIN: CPT | Performed by: INTERNAL MEDICINE

## 2023-12-18 RX ORDER — AZITHROMYCIN 250 MG/1
TABLET, FILM COATED ORAL
Qty: 6 TABLET | Refills: 0 | Status: SHIPPED | OUTPATIENT
Start: 2023-12-18 | End: 2023-12-23

## 2023-12-18 RX ORDER — DEXTROAMPHETAMINE SACCHARATE, AMPHETAMINE ASPARTATE, DEXTROAMPHETAMINE SULFATE AND AMPHETAMINE SULFATE 2.5; 2.5; 2.5; 2.5 MG/1; MG/1; MG/1; MG/1
10 TABLET ORAL 2 TIMES DAILY
Qty: 180 TABLET | Refills: 0 | Status: SHIPPED | OUTPATIENT
Start: 2023-12-18 | End: 2023-12-27

## 2023-12-18 ASSESSMENT — PAIN SCALES - GENERAL: PAINLEVEL: NO PAIN (0)

## 2023-12-18 NOTE — PATIENT INSTRUCTIONS
1. and Typhoid shots today. Rabies vaccine 1st shot today, will need second shot 7 days later. ( Schedule nurse appointment )    2. Get Covid vaccine at the pharmacy    3. We will check on Hep A, B status through labs    4. Ask health department about Cholera.    5. Let me know if planning to travel to malarial area and we will get you Malarone.     6. For traveller's diarrhea: Take azithromycin, if mild diarrhea ( <5 BM/day) can take Peptobismol tabes (can make your stool black).

## 2023-12-18 NOTE — PROGRESS NOTES
"  Assessment & Plan     Travel advice encounter  Going to [place] for 2 months, will spend 1 month and keep down an additional months exploring area, currently does not plan to go to [facility] region.    She will get typhoid vaccine and first rabies vaccine today.  Follow-up in 1 week and get second rabies vaccine in addition to flu and COVID vaccinations.    Discussed to check with health department in regards to cholera vaccination especially since she will be traveling with her friend second part of the trip around the country.    Will check immunity for hepatitis a and B.    Discussed traveler's diarrhea, azithromycin was prescribed, she will bring Pepto-Bismol tablets with her as well.    - TYPHOID VACCINE, IM  - Hepatitis Antibody A IgG; Future  - Hepatitis B Surface Antibody; Future  - azithromycin (ZITHROMAX) 250 MG tablet; Take 2 tablets (500 mg) by mouth daily for 1 day, THEN 1 tablet (250 mg) daily for 4 days.  - RABIES VACCINE, IM (IMOVAX)  - Hepatitis Antibody A IgG  - Hepatitis B Surface Antibody    Attention deficit hyperactivity disorder (ADHD), combined type  Refill provided.  - amphetamine-dextroamphetamine (ADDERALL) 10 MG tablet; Take 1 tablet (10 mg) by mouth 2 times daily     BMI:   Estimated body mass index is 25.49 kg/m  as calculated from the following:    Height as of this encounter: 1.626 m (5' 4.02\").    Weight as of this encounter: 67.4 kg (148 lb 9.6 oz).       Sheeba Booth MD  [facility] JIMI Tillman is a 33 year old, presenting for the following health issues:  Travel Vaccines (Pt traveling to [place] - would like to discuss what is needed for travel. ) and Recheck Medication (Would like to discuss travel and refills otis with Adderall. )        12/18/2023    12:00 PM   Additional Questions   Roomed by Jahaira Tillman is a 33-year-old female with history of anxiety and depression, ADD, IUD, family history of clots.  She is " "currently here for travel advice encounter.    Rosanna reports she plans to travel to [facility] for 2 months.  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    She will need prescriptions for her ADD medications for at least 2-1/2 months.  Previously she was on Celexa but came off of it several months ago and denies depression.    She has not gotten her flu or COVID vaccines yet.    Reviewed malarial region since [place], currently she does not have definite plans to travel there and [facility] is not in the malaria zone.  She will let me know if her plans change.    History of Present Illness       Reason for visit:  Travel to [facility]    She eats 2-3 servings of fruits and vegetables daily.She consumes 0 sweetened beverage(s) daily.She exercises with enough effort to increase her heart rate 60 or more minutes per day.  She exercises with enough effort to increase her heart rate 4 days per week.   She is taking medications regularly.     Review of Systems   As above      Objective    /62 (BP Location: Left arm, Patient Position: Sitting, Cuff Size: Adult Regular)   Pulse 100   Temp 98.7  F (37.1  C) (Tympanic)   Resp 17   Ht 1.626 m (5' 4.02\")   Wt 67.4 kg (148 lb 9.6 oz)   LMP  (LMP Unknown)   SpO2 100%   BMI 25.49 kg/m    Body mass index is 25.49 kg/m .  Physical Exam   General: well appearing female, alert and oriented x3  EYES: Eyelids, conjunctiva, and sclera were normal. Pupils were normal.   HEAD, EARS, NOSE, MOUTH, AND THROAT: no cervical LAD, no thyromegaly or nodules appreciated. TMs are visualized and normal, oropharynx is clear.  RESPIRATORY: respirations non labored, CTA bl, no wheezes, rales, no forced expiratory wheezing.  CARDIOVASCULAR: Heart rate and rhythm were normal. No murmurs, rubs,gallops. There was no peripheral edema.   GASTROINTESTINAL: Positive bowel sounds, abdomen is soft, non tender, non " distended.     MUSCULOSKELETAL: Muscle mass was normal for age. No joint synovitis or deformity.  LYMPHATIC: There were no enlarged nodes palpable.  SKIN/HAIR/NAILS: Skin color was normal.  No rashes.  NEUROLOGIC: The patient was alert and oriented.  Speech was normal.  There is no facial asymmetry.   PSYCHIATRIC:  Mood and affect were normal.

## 2023-12-19 LAB
HAV IGG SER QL IA: REACTIVE
HBV SURFACE AB SERPL IA-ACNC: 1.54 M[IU]/ML
HBV SURFACE AB SERPL IA-ACNC: NONREACTIVE M[IU]/ML

## 2023-12-20 ENCOUNTER — MYC MEDICAL ADVICE (OUTPATIENT)
Dept: INTERNAL MEDICINE | Facility: CLINIC | Age: 33
End: 2023-12-20
Payer: COMMERCIAL

## 2023-12-20 DIAGNOSIS — F90.2 ATTENTION DEFICIT HYPERACTIVITY DISORDER (ADHD), COMBINED TYPE: ICD-10-CM

## 2023-12-20 DIAGNOSIS — Z23 NEED FOR VACCINATION: Primary | ICD-10-CM

## 2023-12-20 DIAGNOSIS — Z71.84 TRAVEL ADVICE ENCOUNTER: ICD-10-CM

## 2023-12-26 ENCOUNTER — ALLIED HEALTH/NURSE VISIT (OUTPATIENT)
Dept: FAMILY MEDICINE | Facility: CLINIC | Age: 33
End: 2023-12-26
Payer: COMMERCIAL

## 2023-12-26 DIAGNOSIS — Z23 NEED FOR VACCINATION: ICD-10-CM

## 2023-12-26 DIAGNOSIS — Z71.84 TRAVEL ADVICE ENCOUNTER: ICD-10-CM

## 2023-12-26 PROCEDURE — 99207 PR NO CHARGE NURSE ONLY: CPT

## 2023-12-26 PROCEDURE — 90746 HEPB VACCINE 3 DOSE ADULT IM: CPT

## 2023-12-26 PROCEDURE — 90472 IMMUNIZATION ADMIN EACH ADD: CPT

## 2023-12-26 PROCEDURE — 90471 IMMUNIZATION ADMIN: CPT

## 2023-12-26 PROCEDURE — 90675 RABIES VACCINE IM: CPT

## 2023-12-26 NOTE — PROGRESS NOTES
Prior to immunization administration, verified patients identity using patient s name and date of birth. Please see Immunization Activity for additional information.     Screening Questionnaire for Adult Immunization    Are you sick today?   No   Do you have allergies to medications, food, a vaccine component or latex?   No   Have you ever had a serious reaction after receiving a vaccination?   No   Do you have a long-term health problem with heart, lung, kidney, or metabolic disease (e.g., diabetes), asthma, a blood disorder, no spleen, complement component deficiency, a cochlear implant, or a spinal fluid leak?  Are you on long-term aspirin therapy?   No   Do you have cancer, leukemia, HIV/AIDS, or any other immune system problem?   No   Do you have a parent, brother, or sister with an immune system problem?   No   In the past 3 months, have you taken medications that affect  your immune system, such as prednisone, other steroids, or anticancer drugs; drugs for the treatment of rheumatoid arthritis, Crohn s disease, or psoriasis; or have you had radiation treatments?   No   Have you had a seizure, or a brain or other nervous system problem?   No   During the past year, have you received a transfusion of blood or blood    products, or been given immune (gamma) globulin or antiviral drug?   No   For women: Are you pregnant or is there a chance you could become       pregnant during the next month?   No   Have you received any vaccinations in the past 4 weeks?   Yes     Immunization questionnaire was positive for at least one answer.    I have reviewed the following standing orders:   This patient is due and qualifies for the Hepatitis B vaccine.    Click here for Hepatitis B Standing Order    I have reviewed the vaccines inclusion and exclusion criteria; No concerns regarding eligibility.     Patient additionally received 2nd series of rabies vaccine, which was ordered by provider at last visit. Patient had no  questions or concerns regarding either of these vaccinations received today.    Screening performed by Esme Radford RN on 12/26/2023 at 11:04 AM.

## 2023-12-27 RX ORDER — ATOVAQUONE AND PROGUANIL HYDROCHLORIDE 250; 100 MG/1; MG/1
1 TABLET, FILM COATED ORAL DAILY
Qty: 24 TABLET | Refills: 0 | Status: SHIPPED | OUTPATIENT
Start: 2023-12-27 | End: 2024-03-25

## 2023-12-27 RX ORDER — DEXTROAMPHETAMINE SACCHARATE, AMPHETAMINE ASPARTATE, DEXTROAMPHETAMINE SULFATE AND AMPHETAMINE SULFATE 2.5; 2.5; 2.5; 2.5 MG/1; MG/1; MG/1; MG/1
10 TABLET ORAL 2 TIMES DAILY
Qty: 180 TABLET | Refills: 0 | Status: SHIPPED | OUTPATIENT
Start: 2023-12-27 | End: 2024-03-25

## 2023-12-29 ENCOUNTER — TELEPHONE (OUTPATIENT)
Dept: INTERNAL MEDICINE | Facility: CLINIC | Age: 33
End: 2023-12-29
Payer: COMMERCIAL

## 2023-12-29 NOTE — TELEPHONE ENCOUNTER
Spoke with pharmacy who states they cannot take a verbal order to dispense. States they need the form filled out. Writer had them refax the form to [phone] for RNs to obtain easier.    Form obtained and filled out by Dr Booth. Faxed back to [facility] at [phone]

## 2023-12-29 NOTE — TELEPHONE ENCOUNTER
General Call    Contacts         Type Contact Phone/Fax    12/29/2023 03:16 PM CST Phone (Incoming) Layne Tariq (Self) [phone] (H)          Reason for Call:  Pharmacy Authorization Form Controlled Substance    What are your questions or concerns:  Patient is traveling out of the country on 01/04/24. Her insurance will not cover medication at 90 or 60 day supply due to controlled substance.  Patient has to pay out of pocket.  Pharmacy has received RX from Dr. Arevalo but they have faxed form for PCP to completed.  Patient does not know what fax number form was sent to.  She states it was faxed earlier today and again about 30 minutes ago.    Date of last appointment with provider: 12/18/23    Could we send this information to you in [employer] or would you prefer to receive a phone call?:   Patient would like to be contacted via [employer] or phone call which ever is easiest for Care Team.

## 2024-01-10 ENCOUNTER — TELEPHONE (OUTPATIENT)
Dept: INTERNAL MEDICINE | Facility: CLINIC | Age: 34
End: 2024-01-10
Payer: COMMERCIAL

## 2024-01-10 NOTE — TELEPHONE ENCOUNTER
January 10, 2024    [place] Dept Human Services Advance Notice of Noncovered RX was picked up from outbox of Dr. Booth.  Paperwork has been reviewed and is complete.  Per initial initial request, this was sent via fax to [phone].     Kizzy Hutton

## 2024-03-23 SDOH — HEALTH STABILITY: PHYSICAL HEALTH: ON AVERAGE, HOW MANY DAYS PER WEEK DO YOU ENGAGE IN MODERATE TO STRENUOUS EXERCISE (LIKE A BRISK WALK)?: 3 DAYS

## 2024-03-23 SDOH — HEALTH STABILITY: PHYSICAL HEALTH: ON AVERAGE, HOW MANY MINUTES DO YOU ENGAGE IN EXERCISE AT THIS LEVEL?: 60 MIN

## 2024-03-23 ASSESSMENT — SOCIAL DETERMINANTS OF HEALTH (SDOH): HOW OFTEN DO YOU GET TOGETHER WITH FRIENDS OR RELATIVES?: TWICE A WEEK

## 2024-03-23 NOTE — COMMUNITY RESOURCES LIST (ENGLISH)
March 23, 2024           YOUR PERSONALIZED LIST OF SERVICES & PROGRAMS           & SHELTER    Housing      Free - Client Services  [address] [place] [address] (Distance: 2.4 miles)  Phone: [phone]  Website: https://DeepDyve.STWA/  Language: English  Fee: Free  Transportation Options: Free transportation      [facility] snf  Phone: [phone]) [phone]  Website: https://www.City Grade.org/  Language: English      [facility] - Housing Stabilization Services  Phone: (841) 625-5291  Website: https://[employer]/Housing-Stabilization.html  Language: English  Hours: Mon 9:00 AM - 5:00 PM Tue 9:00 AM - 5:00 PM Wed 9:00 AM - 5:00 PM Thu 9:00 AM - 5:00 PM Fri 9:00 AM - 5:00 PM  Fee: Insurance  Accessibility: Deaf or hard of hearing, Translation services    Case Management      Living - Housing Stabilization Services  [address] [place] [address] (Distance: 5.9 miles)  Phone: [phone]) [phone]  Website: https://Ohanae.[employer]  Language: Sinhala, English, Ivorian  Fee: Insurance, Self pay      [employer] - Housing Stabilization Services  Phone: (910) 816-1537  Website: https://homebasemn.com/  Language: English  Hours: Mon 8:00 AM - 4:00 PM Tue 8:00 AM - 4:00 PM Wed 8:00 AM - 4:00 PM Thu 8:00 AM - 4:00 PM Fri 8:00 AM - 4:00 PM  Fee: Free  Accessibility: Blind accommodation, Deaf or hard of hearing  Transportation Options: Free transportation      [facility] Housing Stabilization Services (HSS)  Phone: ([phone]) [phone]  Website: https://www.QuickSolard[employer]Advisor Client Match.net/resources  Language: English, Greenlandic  Hours: Mon 8:00 AM - 4:00 PM Tue 8:00 AM - 4:00 PM Wed 8:00 AM - 4:00 PM Thu 8:00 AM - 4:00 PM Fri 8:00 AM - 4:00 PM  Fee: Free, Insurance  Accessibility: Ada accessible, Blind accommodation, Deaf or hard of hearing, Translation services    Drop-In Services      [facility] - Warming or cooling center - [facility]  [facility] and Service [place]  [address] [place] [address] (Distance: 2.8 miles)  Phone: [phone]) [phone]  Language: English, Georgian, Hmong, Slovak  Fee: Free      [facility] & Supportive Services  [address] [place] [address] (Distance: 2.7 miles)  Phone: [phone]) [phone]  Website: https://www.[employer]/housing/  Language: English      LOVE - LAUNDRY LOVE  Website: http://www.laundrylove.org            Bill Payment Assistance      [employer] - Energized  Phone: (828) 468-5859  Website: https://www.whu19-bfkpgetgdckzmj.org/programs.html  Language: English  Hours: Mon 7:00 AM - 7:00 PM Tue 7:00 AM - 7:00 PM Wed 7:00 AM - 7:00 PM Thu 7:00 AM - 7:00 PM Fri 7:00 AM - 7:00 PM  Fee: Free      - Dislocated Worker/Adult [employer] Employment Program  Phone: (255) 795-1739  Email: ethan@[employer]  Website: https://Play [employer]/services/employment-services/dislocated-worker-program/  Language: English, Israeli  Hours: Mon 8:00 AM - 4:30 PM Tue 8:00 AM - 4:30 PM Wed 8:00 AM - 4:30 PM Thu 8:00 AM - 4:30 PM Fri 8:00 AM - 4:30 PM  Fee: Free  Accessibility: Ada accessible       [employer] - HOMEOWNER ASSISTANCE FUND (HAF) PROGRAM  Phone: (659) 371-3783  Website: http://www.coquilletribe.org               IMPORTANT NUMBERS & WEBSITES        Emergency Services  911  .   United Way  211 http://211unitedway.org  .   Poison Control  (541) 140-2208 http://[place]poison.org http://wisconsinpoison.org  .     Suicide and Crisis Lifeline  988 http://988lifeline.org  .   Childhelp National Child Abuse Hotline  767.250.9648 http://Childhelphotline.org   .   National Sexual Assault Hotline  (721) 843-6508 (HOPE) http://Rainn.org   .     National Runaway Safeline  (869) 254-7683 (RUNAWAY) http://appAttachruFischer Medical Technologies.Point.io  .   Pregnancy & Postpartum Support  Call/text 707-018-9673  [place]: http://ppsupportmn.org  [place]: http://[place] Enterprise.com/wi  .   Substance Abuse National Helpline ([facility])  800-622-HELP  (7920) http://Findtreatment.gov   .                DISCLAIMER: [employer] does not endorse any service providers mentioned in this resource list. [employer] does not guarantee that the services mentioned in this resource list will be available to you or will improve your health or wellness.    [facility]

## 2024-03-25 ENCOUNTER — OFFICE VISIT (OUTPATIENT)
Dept: INTERNAL MEDICINE | Facility: CLINIC | Age: 34
End: 2024-03-25
Payer: COMMERCIAL

## 2024-03-25 VITALS
SYSTOLIC BLOOD PRESSURE: 103 MMHG | DIASTOLIC BLOOD PRESSURE: 62 MMHG | HEIGHT: 64 IN | RESPIRATION RATE: 16 BRPM | WEIGHT: 145 LBS | OXYGEN SATURATION: 93 % | TEMPERATURE: 97.7 F | BODY MASS INDEX: 24.75 KG/M2 | HEART RATE: 86 BPM

## 2024-03-25 DIAGNOSIS — Z11.3 SCREEN FOR STD (SEXUALLY TRANSMITTED DISEASE): ICD-10-CM

## 2024-03-25 DIAGNOSIS — Z13.220 LIPID SCREENING: ICD-10-CM

## 2024-03-25 DIAGNOSIS — Z12.4 CERVICAL CANCER SCREENING: ICD-10-CM

## 2024-03-25 DIAGNOSIS — Z00.00 ANNUAL PHYSICAL EXAM: Primary | ICD-10-CM

## 2024-03-25 DIAGNOSIS — A59.9 TRICHOMONAS INFECTION: ICD-10-CM

## 2024-03-25 DIAGNOSIS — Z83.49 FAMILY HISTORY OF HYPOTHYROIDISM: ICD-10-CM

## 2024-03-25 DIAGNOSIS — F90.2 ATTENTION DEFICIT HYPERACTIVITY DISORDER (ADHD), COMBINED TYPE: ICD-10-CM

## 2024-03-25 LAB
AMPHETAMINES UR QL SCN: ABNORMAL
BARBITURATES UR QL SCN: ABNORMAL
BENZODIAZ UR QL SCN: ABNORMAL
BZE UR QL SCN: ABNORMAL
CANNABINOIDS UR QL SCN: ABNORMAL
CHOLEST SERPL-MCNC: 159 MG/DL
CLUE CELLS: ABNORMAL
FASTING STATUS PATIENT QL REPORTED: NORMAL
FENTANYL UR QL: ABNORMAL
HBV SURFACE AG SERPL QL IA: NONREACTIVE
HCV AB SERPL QL IA: NONREACTIVE
HDLC SERPL-MCNC: 57 MG/DL
LDLC SERPL CALC-MCNC: 92 MG/DL
NONHDLC SERPL-MCNC: 102 MG/DL
OPIATES UR QL SCN: ABNORMAL
PCP QUAL URINE (ROCHE): ABNORMAL
T PALLIDUM AB SER QL: NONREACTIVE
TRICHOMONAS, WET PREP: PRESENT
TRIGL SERPL-MCNC: 51 MG/DL
TSH SERPL DL<=0.005 MIU/L-ACNC: 1.28 UIU/ML (ref 0.3–4.2)
WBC'S/HIGH POWER FIELD, WET PREP: ABNORMAL
YEAST, WET PREP: ABNORMAL

## 2024-03-25 PROCEDURE — 36415 COLL VENOUS BLD VENIPUNCTURE: CPT | Performed by: INTERNAL MEDICINE

## 2024-03-25 PROCEDURE — 87624 HPV HI-RISK TYP POOLED RSLT: CPT | Performed by: INTERNAL MEDICINE

## 2024-03-25 PROCEDURE — G0145 SCR C/V CYTO,THINLAYER,RESCR: HCPCS | Performed by: INTERNAL MEDICINE

## 2024-03-25 PROCEDURE — 87591 N.GONORRHOEAE DNA AMP PROB: CPT | Performed by: INTERNAL MEDICINE

## 2024-03-25 PROCEDURE — 87491 CHLMYD TRACH DNA AMP PROBE: CPT | Performed by: INTERNAL MEDICINE

## 2024-03-25 PROCEDURE — 80061 LIPID PANEL: CPT | Performed by: INTERNAL MEDICINE

## 2024-03-25 PROCEDURE — 87210 SMEAR WET MOUNT SALINE/INK: CPT | Performed by: INTERNAL MEDICINE

## 2024-03-25 PROCEDURE — 86803 HEPATITIS C AB TEST: CPT | Performed by: INTERNAL MEDICINE

## 2024-03-25 PROCEDURE — 84443 ASSAY THYROID STIM HORMONE: CPT | Performed by: INTERNAL MEDICINE

## 2024-03-25 PROCEDURE — 99395 PREV VISIT EST AGE 18-39: CPT | Performed by: INTERNAL MEDICINE

## 2024-03-25 PROCEDURE — 99000 SPECIMEN HANDLING OFFICE-LAB: CPT | Mod: 59 | Performed by: INTERNAL MEDICINE

## 2024-03-25 PROCEDURE — 86780 TREPONEMA PALLIDUM: CPT | Performed by: INTERNAL MEDICINE

## 2024-03-25 PROCEDURE — G0480 DRUG TEST DEF 1-7 CLASSES: HCPCS | Mod: 90 | Performed by: INTERNAL MEDICINE

## 2024-03-25 PROCEDURE — 87340 HEPATITIS B SURFACE AG IA: CPT | Performed by: INTERNAL MEDICINE

## 2024-03-25 PROCEDURE — 80307 DRUG TEST PRSMV CHEM ANLYZR: CPT | Performed by: INTERNAL MEDICINE

## 2024-03-25 PROCEDURE — 87389 HIV-1 AG W/HIV-1&-2 AB AG IA: CPT | Performed by: INTERNAL MEDICINE

## 2024-03-25 RX ORDER — DEXTROAMPHETAMINE SACCHARATE, AMPHETAMINE ASPARTATE, DEXTROAMPHETAMINE SULFATE AND AMPHETAMINE SULFATE 2.5; 2.5; 2.5; 2.5 MG/1; MG/1; MG/1; MG/1
10 TABLET ORAL 2 TIMES DAILY
Qty: 180 TABLET | Refills: 0 | Status: SHIPPED | OUTPATIENT
Start: 2024-03-25 | End: 2024-05-15

## 2024-03-25 RX ORDER — METRONIDAZOLE 500 MG/1
500 TABLET ORAL 2 TIMES DAILY
Qty: 14 TABLET | Refills: 0 | Status: SHIPPED | OUTPATIENT
Start: 2024-03-25 | End: 2024-04-01

## 2024-03-25 ASSESSMENT — PAIN SCALES - GENERAL: PAINLEVEL: NO PAIN (0)

## 2024-03-25 NOTE — PROGRESS NOTES
Preventive Care Visit  [facility]  Sheeba Booth MD, Internal Medicine  Mar 25, 2024      Assessment & Plan     Annual physical exam  Will check cholesterol panel today, due to family history of hypothyroidism we will check her Synthroid.  Other labs were normal in 2022.  Pap smear was obtained today.  Of note she had positive high risk HPV and normal Pap smear in 2021 and was referred to colposcopy to [facility], will request records.  Not sure if she had repeat Pap smear in 2022.  Discussed that after abnormal and need at least 2 normal speech year before we start spacing Paps.  - Pap Screen with HPV - recommended age 30 - 65 years    Screen for STD (sexually transmitted disease)  Currently asymptomatic  - Chlamydia trachomatis/Neisseria gonorrhoeae by PCR - Clinic Collect  - Wet prep - Clinic Collect  - Hepatitis B surface antigen  - Hepatitis C antibody  - HIV Antigen Antibody Combo Cascade  - Treponema Abs w Reflex to RPR and Titer    Attention deficit hyperactivity disorder (ADHD), combined type  Refill was provided she is due for urine drug test,  - amphetamine-dextroamphetamine (ADDERALL) 10 MG tablet; Take 1 tablet (10 mg) by mouth 2 times daily  - Urine Drug Screen  - TSH with free T4 reflex    Lipid screening  - Lipid panel reflex to direct LDL Fasting      Counseling  Appropriate preventive services were discussed with this patient, including applicable screening as appropriate for fall prevention, nutrition, physical activity, Tobacco-use cessation, weight loss and cognition.  Checklist reviewing preventive services available has been given to the patient.  Reviewed patient's diet, addressing concerns and/or questions.   She is at risk for lack of exercise and has been provided with information to increase physical activity for the benefit of her well-being.     Alvino Tillman is a 33 year old, presenting for the following:  Physical, Follow Up, and Recheck Medication (Pt  reports that she is here for her physical and follow up of HPV.)        3/25/2024     9:25 AM   Additional Questions   Roomed by gilma   Accompanied by alone         3/25/2024     9:25 AM   Patient Reported Additional Medications   Patient reports taking the following new medications none        Health Care Directive  Patient does not have a Health Care Directive or Living Will:     GÓMEZ Tillman is a 33-year-old female with history of anxiety and depression, ADD, IUD, family history of clots and hypothyroidism.  She is currently here for physical.    She recently came back from a trip to [place] where she was for 2 months volunteering.  She did get an episode of diarrhea while there which was self-limited and currently denies any recurrent gastrointestinal upset.    She is due for Pap smear, she did have positive for HPV and normal Pap smear in 2021, she was referred to colposcopy and was seen by [facility].  She does have an IUD and occasionally has vaginal spotting but no menstrual periods.She has felt a slight lump on the left side next to clitoris, sometimes it is bigger, sometimes it is smaller, she denies any pain or drainage from it.    For ADD she has been taking Adderall twice a day, previously she was on citalopram but since she stopped drinking all alcohol he has been regularly exercising she has been able to come off citalopram and mood has been good.  She does continue home THC Gummies sporadically throughout the week.  No other drug use.    Review of systems: She sleeps well.  No problems with cough or shortness of breath, no dizziness with exertion, no constipation diarrhea or abdominal pain, no urinary symptoms.    She does have family history of skin cancer and sees dermatologist regularly.        3/23/2024   General Health   How would you rate your overall physical health? Excellent   Feel stress (tense, anxious, or unable to sleep) To some extent   (!) STRESS CONCERN      3/23/2024    Nutrition   Three or more servings of calcium each day? Yes   Diet: Regular (no restrictions)   How many servings of fruit and vegetables per day? (!) 2-3   How many sweetened beverages each day? 0-1         3/23/2024   Exercise   Days per week of moderate/strenous exercise 3 days   Average minutes spent exercising at this level 60 min         3/23/2024   Social Factors   Frequency of gathering with friends or relatives Twice a week   Worry food won't last until get money to buy more No   Food not last or not have enough money for food? No   Do you have housing?  No   Are you worried about losing your housing? No   Lack of transportation? No   Unable to get utilities (heat,electricity)? Yes   Want help with housing or utility concern? No   (!) HOUSING CONCERN PRESENT(!) FINANCIAL RESOURCE STRAIN CONCERN      3/23/2024   Dental   Dentist two times every year? Yes         3/23/2024   TB Screening   Were you born outside of the [place]? No         3/23/2024   Substance Use   Alcohol more than 3/day or more than 7/wk Not Applicable   Do you use any other substances recreationally? (!) CANNABIS PRODUCTS     Social History     Tobacco Use    Smoking status: Former     Types: Cigarettes    Smokeless tobacco: Current   Vaping Use    Vaping Use: Some days    Substances: THC, Flavoring    Devices: Disposable   Substance Use Topics    Alcohol use: Yes     Alcohol/week: 0.8 standard drinks of alcohol    Drug use: No           3/4/2022   LAST FHS-7 RESULTS   1st degree relative breast or ovarian cancer No   Any relative bilateral breast cancer No   Any male have breast cancer No   Any ONE woman have BOTH breast AND ovarian cancer No   Any woman with breast cancer before 50yrs No   2 or more relatives with breast AND/OR ovarian cancer No   2 or more relatives with breast AND/OR bowel cancer No                3/23/2024   STI Screening   New sexual partner(s) since last STI/HIV test? (!) YES      History of abnormal Pap smear:          "Latest Ref Rng & Units 1/12/2021     9:07 AM 11/15/2017    12:43 PM   PAP / HPV   PAP Negative for squamous intraepithelial lesion or malignancy. Negative for squamous intraepithelial lesion or malignancy  Electronically signed by Marybeth Pickett CT (ASCP) on 1/20/2021 at  3:54 PM    Negative for squamous intraepithelial lesion or malignancy  Electronically signed by Leroy La CT (ASCP) on 11/24/2017 at  4:12 PM      HPV 16 DNA NEG Positive     HPV 18 DNA NEG Negative     Other HR HPV NEG Negative             3/23/2024   Contraception/Family Planning   Questions about contraception or family planning No        Reviewed and updated as needed this visit by Provider                  Review of systems: As above   Objective    Exam  /62 (BP Location: Left arm, Patient Position: Sitting, Cuff Size: Adult Regular)   Pulse 86   Temp 97.7  F (36.5  C) (Tympanic)   Resp 16   Ht 1.626 m (5' 4\")   Wt 65.8 kg (145 lb)   LMP  (LMP Unknown)   SpO2 93%   BMI 24.89 kg/m     Estimated body mass index is 24.89 kg/m  as calculated from the following:    Height as of this encounter: 1.626 m (5' 4\").    Weight as of this encounter: 65.8 kg (145 lb).    Physical Exam  General: well appearing female, alert and oriented x3  EYES: Eyelids, conjunctiva, and sclera were normal. Pupils were normal.   HEAD, EARS, NOSE, MOUTH, AND THROAT: no cervical LAD, no thyromegaly or nodules appreciated. TMs are visualized and normal, oropharynx is clear.  RESPIRATORY: respirations non labored, CTA bl, no wheezes, rales, no forced expiratory wheezing.  CARDIOVASCULAR: Heart rate and rhythm were normal. No murmurs, rubs,gallops. There was no peripheral edema.   GASTROINTESTINAL: Positive bowel sounds, abdomen is soft, non tender, non distended.     MUSCULOSKELETAL: Muscle mass was normal for age. No joint synovitis or deformity.  LYMPHATIC: There were no enlarged nodes palpable.  SKIN/HAIR/NAILS: Skin color was normal.  No " rashes.  No suspicious lesions, she has several freckles and benign moles on her shoulders.  NEUROLOGIC: The patient was alert and oriented.  Speech was normal.  There is no facial asymmetry.   PSYCHIATRIC:  Mood and affect were normal.   Breast exam: No axilla lymphadenopathy, breast masses or skin changes appreciated.  Gyn: normal external genitalia, slightly fullness on the left side of clitoris, no pain or drainage. Cervix midline, pap obtained.         Signed Electronically by: Sheeba Booth MD

## 2024-03-26 LAB
C TRACH DNA SPEC QL PROBE+SIG AMP: NEGATIVE
HIV 1+2 AB+HIV1 P24 AG SERPL QL IA: NONREACTIVE
N GONORRHOEA DNA SPEC QL NAA+PROBE: NEGATIVE

## 2024-03-27 LAB
BKR LAB AP GYN ADEQUACY: NORMAL
BKR LAB AP GYN INTERPRETATION: NORMAL
BKR LAB AP GYN OTHER FINDINGS: NORMAL
BKR LAB AP HPV REFLEX: NORMAL
BKR LAB AP PREVIOUS ABNL DX: NORMAL
BKR LAB AP PREVIOUS ABNORMAL: NORMAL
PATH REPORT.COMMENTS IMP SPEC: NORMAL
PATH REPORT.COMMENTS IMP SPEC: NORMAL
PATH REPORT.RELEVANT HX SPEC: NORMAL

## 2024-03-28 LAB
AMPHET UR-MCNC: 2863 NG/ML
HUMAN PAPILLOMA VIRUS 16 DNA: NEGATIVE
HUMAN PAPILLOMA VIRUS 18 DNA: NEGATIVE
HUMAN PAPILLOMA VIRUS FINAL DIAGNOSIS: ABNORMAL
HUMAN PAPILLOMA VIRUS OTHER HR: POSITIVE
MDA UR-MCNC: <200 NG/ML
MDEA UR-MCNC: <200 NG/ML
MDMA UR-MCNC: <200 NG/ML
METHAMPHET UR-MCNC: <200 NG/ML
PHENTERMINE UR CFM-MCNC: <200 NG/ML

## 2024-04-03 ENCOUNTER — PATIENT OUTREACH (OUTPATIENT)
Dept: INTERNAL MEDICINE | Facility: CLINIC | Age: 34
End: 2024-04-03
Payer: COMMERCIAL

## 2024-05-15 DIAGNOSIS — F90.2 ATTENTION DEFICIT HYPERACTIVITY DISORDER (ADHD), COMBINED TYPE: ICD-10-CM

## 2024-05-15 RX ORDER — DEXTROAMPHETAMINE SACCHARATE, AMPHETAMINE ASPARTATE, DEXTROAMPHETAMINE SULFATE AND AMPHETAMINE SULFATE 2.5; 2.5; 2.5; 2.5 MG/1; MG/1; MG/1; MG/1
10 TABLET ORAL 2 TIMES DAILY
Qty: 60 TABLET | Refills: 0 | Status: SHIPPED | OUTPATIENT
Start: 2024-05-15 | End: 2024-06-13

## 2024-06-13 ENCOUNTER — MYC REFILL (OUTPATIENT)
Dept: INTERNAL MEDICINE | Facility: CLINIC | Age: 34
End: 2024-06-13
Payer: COMMERCIAL

## 2024-06-13 DIAGNOSIS — F90.2 ATTENTION DEFICIT HYPERACTIVITY DISORDER (ADHD), COMBINED TYPE: ICD-10-CM

## 2024-06-13 RX ORDER — DEXTROAMPHETAMINE SACCHARATE, AMPHETAMINE ASPARTATE, DEXTROAMPHETAMINE SULFATE AND AMPHETAMINE SULFATE 2.5; 2.5; 2.5; 2.5 MG/1; MG/1; MG/1; MG/1
10 TABLET ORAL 2 TIMES DAILY
Qty: 60 TABLET | Refills: 0 | OUTPATIENT
Start: 2024-06-13

## 2024-06-13 RX ORDER — DEXTROAMPHETAMINE SACCHARATE, AMPHETAMINE ASPARTATE, DEXTROAMPHETAMINE SULFATE AND AMPHETAMINE SULFATE 2.5; 2.5; 2.5; 2.5 MG/1; MG/1; MG/1; MG/1
10 TABLET ORAL 2 TIMES DAILY
Qty: 60 TABLET | Refills: 0 | Status: SHIPPED | OUTPATIENT
Start: 2024-06-13 | End: 2024-07-15

## 2024-07-15 ENCOUNTER — MYC REFILL (OUTPATIENT)
Dept: INTERNAL MEDICINE | Facility: CLINIC | Age: 34
End: 2024-07-15
Payer: COMMERCIAL

## 2024-07-15 DIAGNOSIS — F90.2 ATTENTION DEFICIT HYPERACTIVITY DISORDER (ADHD), COMBINED TYPE: ICD-10-CM

## 2024-07-15 RX ORDER — DEXTROAMPHETAMINE SACCHARATE, AMPHETAMINE ASPARTATE, DEXTROAMPHETAMINE SULFATE AND AMPHETAMINE SULFATE 2.5; 2.5; 2.5; 2.5 MG/1; MG/1; MG/1; MG/1
10 TABLET ORAL 2 TIMES DAILY
Qty: 60 TABLET | Refills: 0 | Status: SHIPPED | OUTPATIENT
Start: 2024-07-15 | End: 2024-08-08

## 2024-07-15 RX ORDER — DEXTROAMPHETAMINE SACCHARATE, AMPHETAMINE ASPARTATE, DEXTROAMPHETAMINE SULFATE AND AMPHETAMINE SULFATE 2.5; 2.5; 2.5; 2.5 MG/1; MG/1; MG/1; MG/1
10 TABLET ORAL 2 TIMES DAILY
Qty: 60 TABLET | Refills: 0 | Status: CANCELLED | OUTPATIENT
Start: 2024-07-15

## 2024-07-16 RX ORDER — DEXTROAMPHETAMINE SACCHARATE, AMPHETAMINE ASPARTATE, DEXTROAMPHETAMINE SULFATE AND AMPHETAMINE SULFATE 2.5; 2.5; 2.5; 2.5 MG/1; MG/1; MG/1; MG/1
10 TABLET ORAL 2 TIMES DAILY
Qty: 60 TABLET | Refills: 0 | OUTPATIENT
Start: 2024-07-16

## 2024-08-08 ENCOUNTER — MYC REFILL (OUTPATIENT)
Dept: INTERNAL MEDICINE | Facility: CLINIC | Age: 34
End: 2024-08-08
Payer: COMMERCIAL

## 2024-08-08 DIAGNOSIS — F90.2 ATTENTION DEFICIT HYPERACTIVITY DISORDER (ADHD), COMBINED TYPE: ICD-10-CM

## 2024-08-08 RX ORDER — DEXTROAMPHETAMINE SACCHARATE, AMPHETAMINE ASPARTATE, DEXTROAMPHETAMINE SULFATE AND AMPHETAMINE SULFATE 2.5; 2.5; 2.5; 2.5 MG/1; MG/1; MG/1; MG/1
10 TABLET ORAL 2 TIMES DAILY
Qty: 60 TABLET | Refills: 0 | Status: SHIPPED | OUTPATIENT
Start: 2024-08-08 | End: 2024-09-16

## 2024-08-20 ENCOUNTER — TRANSFERRED RECORDS (OUTPATIENT)
Dept: HEALTH INFORMATION MANAGEMENT | Facility: CLINIC | Age: 34
End: 2024-08-20
Payer: COMMERCIAL

## 2024-09-16 ENCOUNTER — MYC REFILL (OUTPATIENT)
Dept: INTERNAL MEDICINE | Facility: CLINIC | Age: 34
End: 2024-09-16
Payer: COMMERCIAL

## 2024-09-16 DIAGNOSIS — F90.2 ATTENTION DEFICIT HYPERACTIVITY DISORDER (ADHD), COMBINED TYPE: ICD-10-CM

## 2024-09-16 RX ORDER — DEXTROAMPHETAMINE SACCHARATE, AMPHETAMINE ASPARTATE, DEXTROAMPHETAMINE SULFATE AND AMPHETAMINE SULFATE 2.5; 2.5; 2.5; 2.5 MG/1; MG/1; MG/1; MG/1
10 TABLET ORAL 2 TIMES DAILY
Qty: 60 TABLET | Refills: 0 | Status: SHIPPED | OUTPATIENT
Start: 2024-09-16

## 2024-10-15 ENCOUNTER — MYC REFILL (OUTPATIENT)
Dept: INTERNAL MEDICINE | Facility: CLINIC | Age: 34
End: 2024-10-15
Payer: COMMERCIAL

## 2024-10-15 DIAGNOSIS — F90.2 ATTENTION DEFICIT HYPERACTIVITY DISORDER (ADHD), COMBINED TYPE: ICD-10-CM

## 2024-10-15 RX ORDER — DEXTROAMPHETAMINE SACCHARATE, AMPHETAMINE ASPARTATE, DEXTROAMPHETAMINE SULFATE AND AMPHETAMINE SULFATE 2.5; 2.5; 2.5; 2.5 MG/1; MG/1; MG/1; MG/1
10 TABLET ORAL 2 TIMES DAILY
Qty: 60 TABLET | Refills: 0 | Status: SHIPPED | OUTPATIENT
Start: 2024-10-15 | End: 2024-11-10

## 2024-11-05 ASSESSMENT — PATIENT HEALTH QUESTIONNAIRE - PHQ9: SUM OF ALL RESPONSES TO PHQ QUESTIONS 1-9: 8

## 2024-11-10 ENCOUNTER — MYC MEDICAL ADVICE (OUTPATIENT)
Dept: INTERNAL MEDICINE | Facility: CLINIC | Age: 34
End: 2024-11-10
Payer: COMMERCIAL

## 2024-11-10 ENCOUNTER — MYC REFILL (OUTPATIENT)
Dept: INTERNAL MEDICINE | Facility: CLINIC | Age: 34
End: 2024-11-10
Payer: COMMERCIAL

## 2024-11-10 DIAGNOSIS — F90.2 ATTENTION DEFICIT HYPERACTIVITY DISORDER (ADHD), COMBINED TYPE: ICD-10-CM

## 2024-11-11 RX ORDER — DEXTROAMPHETAMINE SACCHARATE, AMPHETAMINE ASPARTATE, DEXTROAMPHETAMINE SULFATE AND AMPHETAMINE SULFATE 2.5; 2.5; 2.5; 2.5 MG/1; MG/1; MG/1; MG/1
10 TABLET ORAL 2 TIMES DAILY
Qty: 60 TABLET | Refills: 0 | Status: SHIPPED | OUTPATIENT
Start: 2024-11-11

## 2024-11-11 NOTE — TELEPHONE ENCOUNTER
"Lm on vm and sent myc message to call and speak to the care team to schedule with Dr. Booth.  Approved for \"same day\" slots.  "

## 2024-11-13 ENCOUNTER — OFFICE VISIT (OUTPATIENT)
Dept: INTERNAL MEDICINE | Facility: CLINIC | Age: 34
End: 2024-11-13
Payer: COMMERCIAL

## 2024-11-13 VITALS
HEIGHT: 65 IN | BODY MASS INDEX: 23.93 KG/M2 | OXYGEN SATURATION: 99 % | HEART RATE: 97 BPM | RESPIRATION RATE: 18 BRPM | TEMPERATURE: 97 F | DIASTOLIC BLOOD PRESSURE: 72 MMHG | WEIGHT: 143.6 LBS | SYSTOLIC BLOOD PRESSURE: 118 MMHG

## 2024-11-13 DIAGNOSIS — Z00.00 HEALTHCARE MAINTENANCE: ICD-10-CM

## 2024-11-13 DIAGNOSIS — Z23 HIGH PRIORITY FOR 2019-NCOV VACCINE: ICD-10-CM

## 2024-11-13 DIAGNOSIS — R05.2 SUBACUTE COUGH: Primary | ICD-10-CM

## 2024-11-13 PROCEDURE — 90471 IMMUNIZATION ADMIN: CPT | Performed by: INTERNAL MEDICINE

## 2024-11-13 PROCEDURE — 90656 IIV3 VACC NO PRSV 0.5 ML IM: CPT | Performed by: INTERNAL MEDICINE

## 2024-11-13 PROCEDURE — 90480 ADMN SARSCOV2 VAC 1/ONLY CMP: CPT | Performed by: INTERNAL MEDICINE

## 2024-11-13 PROCEDURE — 99213 OFFICE O/P EST LOW 20 MIN: CPT | Mod: 25 | Performed by: INTERNAL MEDICINE

## 2024-11-13 PROCEDURE — 91320 SARSCV2 VAC 30MCG TRS-SUC IM: CPT | Performed by: INTERNAL MEDICINE

## 2024-11-13 RX ORDER — FLUTICASONE PROPIONATE 50 MCG
1 SPRAY, SUSPENSION (ML) NASAL DAILY
Qty: 16 G | Refills: 3 | Status: SHIPPED | OUTPATIENT
Start: 2024-11-13

## 2024-11-13 RX ORDER — BENZONATATE 200 MG/1
200 CAPSULE ORAL 3 TIMES DAILY PRN
Qty: 30 CAPSULE | Refills: 1 | Status: SHIPPED | OUTPATIENT
Start: 2024-11-13

## 2024-11-13 NOTE — PROGRESS NOTES
[facility]    Assessment/Plan:    1. Subacute cough (Primary)  Suspect postviral cough, alternatively mild bacterial resolving bronchitis is possible.  Will start with Flonase and Tessalon, if not better she will let me know and we will order a Z-Theodore, if she is still not improving will order chest x-ray, lung exam and vital signs today are reassuring.  - fluticasone (FLONASE) 50 MCG/ACT nasal spray; Spray 1 spray into both nostrils daily.  Dispense: 16 g; Refill: 3  - benzonatate (TESSALON) 200 MG capsule; Take 1 capsule (200 mg) by mouth 3 times daily as needed for cough.  Dispense: 30 capsule; Refill: 1  - XR Chest 2 Views; Future    2. Healthcare maintenance  Flu and COVID shots were administered today  Discussed that she may feel tired and rundown and have a low-grade temperature for the next 2 days.      Patient Instructions   Start with Flonase and cough suppressant. If not better or new symptoms develop, let me know and I'll send antibiotic in.    2. Chest XR today    3. Vaccines ( may feel tired, low grade temp is normal for 48 hours after vaccine).       Sheeba Booth MD, MD    Chief Complaint:    Chief Complaint   Patient presents with    Imm/Inj     COVID-19 VACCINE       History of Present Illness:  Layne is a 34 year old female  with history of anxiety and depression, ADD, IUD, family history of clots and hypothyroidism.  She is currently here for acute visit due to persistent cough.    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  In September she developed some allergy symptoms followed by upper respiratory symptoms with sore throat cough and nasal drainage.  She did not make much of it however cough has continued although it has improved.  Currently she has a lot of coworkers who was recently diagnosed with pneumonia and she got worried.    Currently cough is primarily dry and is triggered if she is laughing or talking a lot.  She feels a  tickle in the back of her throat.  She denies any cough at nighttime.  Symptoms have been there for 2 months and very slowly getting better.  She denies any sinus pain or pressure, very mild postnasal drainage, for allergies she takes Allegra, no history of asthma or smoking or weeping.    She has been exercising regularly and denies any shortness of breath or chest pains, no fevers chills malaise a low appetite.    Review of Systems:  A comprehensive review of systems was performed and was otherwise negative    PFSH:  Social History: She works as a teacher at a   History   Smoking Status    Former    Types: Cigarettes   Smokeless Tobacco    Current     Social History     Social History Narrative    She is a  and does  for her sister.  She lives with her parents.       Past History: Reviewed  Current Outpatient Medications   Medication Sig Dispense Refill    benzonatate (TESSALON) 200 MG capsule Take 1 capsule (200 mg) by mouth 3 times daily as needed for cough. 30 capsule 1    fluticasone (FLONASE) 50 MCG/ACT nasal spray Spray 1 spray into both nostrils daily. 16 g 3    amphetamine-dextroamphetamine (ADDERALL) 10 MG tablet Take 1 tablet (10 mg) by mouth 2 times daily. 60 tablet 0    chlorophyll 100 MG TABS tablet Take 100 mg by mouth daily      fexofenadine (ALLEGRA) 180 MG tablet [FEXOFENADINE (ALLEGRA) 180 MG TABLET] Take 180 mg by mouth daily.      levonorgestrel (MIRENA) 20 mcg/24 hr (5 years) IUD [LEVONORGESTREL (MIRENA) 20 MCG/24 HR (5 YEARS) IUD] 1 each by Intrauterine route once. Mirena IUD Inserted by Sarah Hook CNM on 1-6-16   (Due for removal by 1-6-2021)  [employer]  Lot [id number]  Exp 04/18  NDC 59826-978-75      triamcinolone (KENALOG) 0.025 % external ointment Apply topically 2 times daily Apply to affected area twicea day for 1 week, then take one week off, may repeat. 80 g 0    Vitamin D, Cholecalciferol, 25 MCG (1000 UT) CAPS          Family History:  Reviewed    Physical Exam:    There were no vitals filed for this visit.  Oxygen saturation 99%, temperature 36.7  C  Wt Readings from Last 3 Encounters:   03/25/24 65.8 kg (145 lb)   12/18/23 67.4 kg (148 lb 9.6 oz)   01/09/23 68.9 kg (152 lb)     There is no height or weight on file to calculate BMI.    Constitutional:  Reveals a pleasant nontoxic appearing female.  Vitals:  Per nursing notes.  HEENT:No cervical LAD, no thyromegaly,  conjunctiva is pink, no scleral icterus, TMs are visualized and normal bl, oropharynx is clear, no exudates, no sinus tenderness to palpation  Cardiac:  Regular rate and rhythm,no murmurs, rubs, or gallops.  Legs without edema. Palpation of the radial pulse regular.  Lungs: Clear to auscultation bl.  Respiratory effort normal.  No wheezing or mucus impaction when she coughs  Abdomen:positive BS, soft, nontender, nondistended.  No hepato-splenomagaly  Skin:   Without rash, bruise, or palpable lesions.  Rheumatologic: Normal joints and nails of the hands.  Neurologic:  Cranial nerves II-XII intact.     Psychiatric: affect appropriate, memory intact.

## 2024-11-13 NOTE — PATIENT INSTRUCTIONS
Start with Flonase and cough suppressant. If not better or new symptoms develop, let me know and I'll send antibiotic in.    2. Chest XR today    3. Vaccines ( may feel tired, low grade temp is normal for 48 hours after vaccine).

## 2024-11-14 ENCOUNTER — ANCILLARY PROCEDURE (OUTPATIENT)
Dept: GENERAL RADIOLOGY | Facility: CLINIC | Age: 34
End: 2024-11-14
Attending: INTERNAL MEDICINE
Payer: COMMERCIAL

## 2024-11-14 DIAGNOSIS — R05.2 SUBACUTE COUGH: ICD-10-CM

## 2024-11-14 PROCEDURE — 71046 X-RAY EXAM CHEST 2 VIEWS: CPT | Mod: TC | Performed by: RADIOLOGY

## 2024-12-10 ENCOUNTER — MYC REFILL (OUTPATIENT)
Dept: INTERNAL MEDICINE | Facility: CLINIC | Age: 34
End: 2024-12-10
Payer: COMMERCIAL

## 2024-12-10 DIAGNOSIS — F90.2 ATTENTION DEFICIT HYPERACTIVITY DISORDER (ADHD), COMBINED TYPE: ICD-10-CM

## 2024-12-10 RX ORDER — DEXTROAMPHETAMINE SACCHARATE, AMPHETAMINE ASPARTATE, DEXTROAMPHETAMINE SULFATE AND AMPHETAMINE SULFATE 2.5; 2.5; 2.5; 2.5 MG/1; MG/1; MG/1; MG/1
10 TABLET ORAL 2 TIMES DAILY
Qty: 60 TABLET | Refills: 0 | Status: SHIPPED | OUTPATIENT
Start: 2024-12-10 | End: 2024-12-10

## 2024-12-10 RX ORDER — DEXTROAMPHETAMINE SACCHARATE, AMPHETAMINE ASPARTATE, DEXTROAMPHETAMINE SULFATE AND AMPHETAMINE SULFATE 2.5; 2.5; 2.5; 2.5 MG/1; MG/1; MG/1; MG/1
10 TABLET ORAL 2 TIMES DAILY
Qty: 60 TABLET | Refills: 0 | Status: SHIPPED | OUTPATIENT
Start: 2024-12-10

## 2025-01-07 ENCOUNTER — MYC REFILL (OUTPATIENT)
Dept: INTERNAL MEDICINE | Facility: CLINIC | Age: 35
End: 2025-01-07

## 2025-01-07 DIAGNOSIS — F90.2 ATTENTION DEFICIT HYPERACTIVITY DISORDER (ADHD), COMBINED TYPE: ICD-10-CM

## 2025-01-08 RX ORDER — DEXTROAMPHETAMINE SACCHARATE, AMPHETAMINE ASPARTATE, DEXTROAMPHETAMINE SULFATE AND AMPHETAMINE SULFATE 2.5; 2.5; 2.5; 2.5 MG/1; MG/1; MG/1; MG/1
10 TABLET ORAL 2 TIMES DAILY
Qty: 60 TABLET | Refills: 0 | Status: SHIPPED | OUTPATIENT
Start: 2025-01-08

## 2025-01-15 ENCOUNTER — MYC REFILL (OUTPATIENT)
Dept: INTERNAL MEDICINE | Facility: CLINIC | Age: 35
End: 2025-01-15

## 2025-01-15 DIAGNOSIS — F90.2 ATTENTION DEFICIT HYPERACTIVITY DISORDER (ADHD), COMBINED TYPE: ICD-10-CM

## 2025-01-15 RX ORDER — DEXTROAMPHETAMINE SACCHARATE, AMPHETAMINE ASPARTATE, DEXTROAMPHETAMINE SULFATE AND AMPHETAMINE SULFATE 2.5; 2.5; 2.5; 2.5 MG/1; MG/1; MG/1; MG/1
10 TABLET ORAL 2 TIMES DAILY
Qty: 60 TABLET | Refills: 0 | Status: SHIPPED | OUTPATIENT
Start: 2025-01-15 | End: 2025-01-16

## 2025-01-16 ENCOUNTER — MYC MEDICAL ADVICE (OUTPATIENT)
Dept: INTERNAL MEDICINE | Facility: CLINIC | Age: 35
End: 2025-01-16

## 2025-01-16 ENCOUNTER — MYC REFILL (OUTPATIENT)
Dept: INTERNAL MEDICINE | Facility: CLINIC | Age: 35
End: 2025-01-16

## 2025-01-16 DIAGNOSIS — F90.2 ATTENTION DEFICIT HYPERACTIVITY DISORDER (ADHD), COMBINED TYPE: ICD-10-CM

## 2025-01-16 RX ORDER — DEXTROAMPHETAMINE SACCHARATE, AMPHETAMINE ASPARTATE, DEXTROAMPHETAMINE SULFATE AND AMPHETAMINE SULFATE 2.5; 2.5; 2.5; 2.5 MG/1; MG/1; MG/1; MG/1
10 TABLET ORAL 2 TIMES DAILY
Qty: 60 TABLET | Refills: 0 | OUTPATIENT
Start: 2025-01-16

## 2025-01-16 RX ORDER — DEXTROAMPHETAMINE SACCHARATE, AMPHETAMINE ASPARTATE, DEXTROAMPHETAMINE SULFATE AND AMPHETAMINE SULFATE 2.5; 2.5; 2.5; 2.5 MG/1; MG/1; MG/1; MG/1
10 TABLET ORAL 2 TIMES DAILY
Qty: 60 TABLET | Refills: 0 | Status: SHIPPED | OUTPATIENT
Start: 2025-01-16

## 2025-01-16 NOTE — TELEPHONE ENCOUNTER
New prescription for Adderall ordered, I am not sure why she is told that insurance will not cover it, she is not too early for picking up medication, [facility] is there is shortage of Adderall or do see any other problem why she is unable to fill it?

## 2025-01-16 NOTE — TELEPHONE ENCOUNTER
Pt states having issues getting this filled due to insurance issues - pt thinks [facility] will accept it well that's what they told her - most pharmacies are telling her they will fill it and then they get the script and tell her the insurance will not cover it please send pended medication to [facility] on [address]

## 2025-02-13 ENCOUNTER — MYC REFILL (OUTPATIENT)
Dept: INTERNAL MEDICINE | Facility: CLINIC | Age: 35
End: 2025-02-13

## 2025-02-13 DIAGNOSIS — F90.2 ATTENTION DEFICIT HYPERACTIVITY DISORDER (ADHD), COMBINED TYPE: ICD-10-CM

## 2025-02-13 RX ORDER — DEXTROAMPHETAMINE SACCHARATE, AMPHETAMINE ASPARTATE, DEXTROAMPHETAMINE SULFATE AND AMPHETAMINE SULFATE 2.5; 2.5; 2.5; 2.5 MG/1; MG/1; MG/1; MG/1
10 TABLET ORAL 2 TIMES DAILY
Qty: 60 TABLET | Refills: 0 | Status: SHIPPED | OUTPATIENT
Start: 2025-02-13

## 2025-02-19 ENCOUNTER — MYC REFILL (OUTPATIENT)
Dept: INTERNAL MEDICINE | Facility: CLINIC | Age: 35
End: 2025-02-19

## 2025-02-19 DIAGNOSIS — F90.2 ATTENTION DEFICIT HYPERACTIVITY DISORDER (ADHD), COMBINED TYPE: ICD-10-CM

## 2025-02-19 RX ORDER — DEXTROAMPHETAMINE SACCHARATE, AMPHETAMINE ASPARTATE, DEXTROAMPHETAMINE SULFATE AND AMPHETAMINE SULFATE 2.5; 2.5; 2.5; 2.5 MG/1; MG/1; MG/1; MG/1
10 TABLET ORAL 2 TIMES DAILY
Qty: 60 TABLET | Refills: 0 | Status: SHIPPED | OUTPATIENT
Start: 2025-02-19

## 2025-02-24 ENCOUNTER — PATIENT OUTREACH (OUTPATIENT)
Dept: CARE COORDINATION | Facility: CLINIC | Age: 35
End: 2025-02-24

## 2025-03-06 ENCOUNTER — PATIENT OUTREACH (OUTPATIENT)
Dept: INTERNAL MEDICINE | Facility: CLINIC | Age: 35
End: 2025-03-06

## 2025-03-10 ENCOUNTER — PATIENT OUTREACH (OUTPATIENT)
Dept: CARE COORDINATION | Facility: CLINIC | Age: 35
End: 2025-03-10

## 2025-03-13 ENCOUNTER — MYC REFILL (OUTPATIENT)
Dept: INTERNAL MEDICINE | Facility: CLINIC | Age: 35
End: 2025-03-13

## 2025-03-13 DIAGNOSIS — F90.2 ATTENTION DEFICIT HYPERACTIVITY DISORDER (ADHD), COMBINED TYPE: ICD-10-CM

## 2025-03-13 RX ORDER — DEXTROAMPHETAMINE SACCHARATE, AMPHETAMINE ASPARTATE, DEXTROAMPHETAMINE SULFATE AND AMPHETAMINE SULFATE 2.5; 2.5; 2.5; 2.5 MG/1; MG/1; MG/1; MG/1
10 TABLET ORAL 2 TIMES DAILY
Qty: 60 TABLET | Refills: 0 | Status: SHIPPED | OUTPATIENT
Start: 2025-03-20

## 2025-04-14 ENCOUNTER — MYC REFILL (OUTPATIENT)
Dept: INTERNAL MEDICINE | Facility: CLINIC | Age: 35
End: 2025-04-14
Payer: COMMERCIAL

## 2025-04-14 DIAGNOSIS — F90.2 ATTENTION DEFICIT HYPERACTIVITY DISORDER (ADHD), COMBINED TYPE: ICD-10-CM

## 2025-04-15 RX ORDER — DEXTROAMPHETAMINE SACCHARATE, AMPHETAMINE ASPARTATE, DEXTROAMPHETAMINE SULFATE AND AMPHETAMINE SULFATE 2.5; 2.5; 2.5; 2.5 MG/1; MG/1; MG/1; MG/1
10 TABLET ORAL 2 TIMES DAILY
Qty: 60 TABLET | Refills: 0 | Status: SHIPPED | OUTPATIENT
Start: 2025-04-15

## 2025-04-26 ENCOUNTER — HEALTH MAINTENANCE LETTER (OUTPATIENT)
Age: 35
End: 2025-04-26

## 2025-05-05 ENCOUNTER — OFFICE VISIT (OUTPATIENT)
Dept: MIDWIFE SERVICES | Facility: CLINIC | Age: 35
End: 2025-05-05
Payer: COMMERCIAL

## 2025-05-05 VITALS
HEART RATE: 84 BPM | OXYGEN SATURATION: 100 % | BODY MASS INDEX: 24.83 KG/M2 | DIASTOLIC BLOOD PRESSURE: 64 MMHG | HEIGHT: 65 IN | WEIGHT: 149 LBS | SYSTOLIC BLOOD PRESSURE: 111 MMHG

## 2025-05-05 DIAGNOSIS — Z11.3 SCREENING EXAMINATION FOR STI: ICD-10-CM

## 2025-05-05 DIAGNOSIS — B97.7 HPV (HUMAN PAPILLOMA VIRUS) INFECTION: ICD-10-CM

## 2025-05-05 DIAGNOSIS — Z30.433 ENCOUNTER FOR REMOVAL AND REINSERTION OF INTRAUTERINE CONTRACEPTIVE DEVICE: Primary | ICD-10-CM

## 2025-05-05 DIAGNOSIS — Z01.419 ENCOUNTER FOR GYNECOLOGICAL EXAMINATION WITHOUT ABNORMAL FINDING: ICD-10-CM

## 2025-05-05 PROCEDURE — 87491 CHLMYD TRACH DNA AMP PROBE: CPT | Performed by: ADVANCED PRACTICE MIDWIFE

## 2025-05-05 PROCEDURE — 58300 INSERT INTRAUTERINE DEVICE: CPT | Performed by: ADVANCED PRACTICE MIDWIFE

## 2025-05-05 PROCEDURE — 87624 HPV HI-RISK TYP POOLED RSLT: CPT | Performed by: ADVANCED PRACTICE MIDWIFE

## 2025-05-05 PROCEDURE — 99203 OFFICE O/P NEW LOW 30 MIN: CPT | Mod: 25 | Performed by: ADVANCED PRACTICE MIDWIFE

## 2025-05-05 PROCEDURE — 58301 REMOVE INTRAUTERINE DEVICE: CPT | Performed by: ADVANCED PRACTICE MIDWIFE

## 2025-05-05 PROCEDURE — 87591 N.GONORRHOEAE DNA AMP PROB: CPT | Performed by: ADVANCED PRACTICE MIDWIFE

## 2025-05-05 NOTE — PROGRESS NOTES
"Layne is a 34 year old No obstetric history on file. female who presents for annual exam.     Menses are rare   No LMP recorded. (Menstrual status: IUD).. Using IUD for contraception.  She is not currently considering pregnancy.  Besides routine health maintenance, she has no other health concerns today .  GYNECOLOGIC HISTORY:  Menarche:   Layne is sexually active with 1male partner(s) and is currently in monogamous relationship.    History sexually transmitted infections:No STD history  STI testing offered?  Accepted  PATRICIA exposure: No  History of abnormal Pap smear: No - age 30- 64 PAP with HPV every 5 years recommended  Family history of breast CA: No  Family history of uterine/ovarian CA: No    Family history of colon CA: No    HEALTH MAINTENANCE:  Cholesterol: (  Cholesterol   Date Value Ref Range Status   03/25/2024 159 <200 mg/dL Final   03/04/2022 183 <=199 mg/dL Final    History of abnormal lipids: No  Mammo: na . History of abnormal Mammo: Not applicable.  Regular Self Breast Exams: Yes  Calcium/Vitamin D intake: source:  dietary supplement(s) Adequate? Yes  TSH: (  TSH   Date Value Ref Range Status   03/25/2024 1.28 0.30 - 4.20 uIU/mL Final   03/04/2022 1.47 0.30 - 5.00 uIU/mL Final    )  Pap; (No results found for: \"PAP\" )    HISTORY:  OB History   No obstetric history on file.     Past Medical History:   Diagnosis Date    Cervical high risk HPV (human papillomavirus) test positive 1/12/2021 4/25/14 NIL 11/15/17 NIL 1/12/21 NIL pap, +HR HPV 16. Plan: colposcopy due before 4/12/21     No past surgical history on file.  Family History   Problem Relation Age of Onset    Cancer Father     Diabetes Father     Heart Disease Father     Dementia Maternal Grandmother     Cancer Maternal Grandfather     Anxiety Disorder Brother     Deep Vein Thrombosis Sister      Social History     Socioeconomic History    Marital status: Single   Tobacco Use    Smoking status: Former     Types: Cigarettes    Smokeless " tobacco: Current   Vaping Use    Vaping status: Some Days    Substances: THC, Flavoring    Devices: Disposable   Substance and Sexual Activity    Alcohol use: Yes     Alcohol/week: 0.8 standard drinks of alcohol    Drug use: No    Sexual activity: Yes   Social History Narrative    She is a  and does  for her sister.  She lives with her parents.     Social Drivers of Health     Financial Resource Strain: High Risk (3/23/2024)    Financial Resource Strain     Within the past 12 months, have you or your family members you live with been unable to get utilities (heat, electricity) when it was really needed?: Yes   Food Insecurity: Low Risk  (3/23/2024)    Food Insecurity     Within the past 12 months, did you worry that your food would run out before you got money to buy more?: No     Within the past 12 months, did the food you bought just not last and you didn t have money to get more?: No   Transportation Needs: Low Risk  (3/23/2024)    Transportation Needs     Within the past 12 months, has lack of transportation kept you from medical appointments, getting your medicines, non-medical meetings or appointments, work, or from getting things that you need?: No   Physical Activity: Sufficiently Active (3/23/2024)    Exercise Vital Sign     Days of Exercise per Week: 3 days     Minutes of Exercise per Session: 60 min   Stress: Stress Concern Present (3/23/2024)    [place] [employer] - Occupational Stress Questionnaire     Feeling of Stress : To some extent   Social Connections: Unknown (3/23/2024)    Social Connection and Isolation Panel [NHANES]     Frequency of Social Gatherings with Friends and Family: Twice a week   Interpersonal Safety: Low Risk  (3/25/2024)    Interpersonal Safety     Do you feel physically and emotionally safe where you currently live?: Yes     Within the past 12 months, have you been hit, slapped, kicked or otherwise physically hurt by someone?: No     Within the  past 12 months, have you been humiliated or emotionally abused in other ways by your partner or ex-partner?: No   Housing Stability: High Risk (3/23/2024)    Housing Stability     Do you have housing? : No     Are you worried about losing your housing?: No       Current Outpatient Medications:     amphetamine-dextroamphetamine (ADDERALL) 10 MG tablet, Take 1 tablet (10 mg) by mouth 2 times daily., Disp: 60 tablet, Rfl: 0    benzonatate (TESSALON) 200 MG capsule, Take 1 capsule (200 mg) by mouth 3 times daily as needed for cough., Disp: 30 capsule, Rfl: 1    chlorophyll 100 MG TABS tablet, Take 100 mg by mouth daily, Disp: , Rfl:     fexofenadine (ALLEGRA) 180 MG tablet, [FEXOFENADINE (ALLEGRA) 180 MG TABLET] Take 180 mg by mouth daily., Disp: , Rfl:     fluticasone (FLONASE) 50 MCG/ACT nasal spray, Spray 1 spray into both nostrils daily., Disp: 16 g, Rfl: 3    levonorgestrel (MIRENA) 20 mcg/24 hr (5 years) IUD, [LEVONORGESTREL (MIRENA) 20 MCG/24 HR (5 YEARS) IUD] 1 each by Intrauterine route once. Mirena IUD Inserted by Sarah Hook CNM on 1-6-16   (Due for removal by 1-6-2021)  "[employer]  Lot [id number]  Exp 04/18  NDC 87535-956-71, Disp: , Rfl:     triamcinolone (KENALOG) 0.025 % external ointment, Apply topically 2 times daily Apply to affected area twicea day for 1 week, then take one week off, may repeat., Disp: 80 g, Rfl: 0    Vitamin D, Cholecalciferol, 25 MCG (1000 UT) CAPS, , Disp: , Rfl:    No Known Allergies    Past medical, surgical, social and family history were reviewed and updated in EPIC.    ROS:   C:     NEGATIVE for fever, chills, change in weight  I:       NEGATIVE for worrisome rashes, moles or lesions  E:     NEGATIVE for vision changes or irritation  E/M: NEGATIVE for ear, mouth and throat problems  R:     NEGATIVE for significant cough or SOB  CV:   NEGATIVE for chest pain, palpitations or peripheral edema  GI:     NEGATIVE for nausea, abdominal pain, heartburn, or change  "in bowel habits  :   NEGATIVE for frequency, dysuria, hematuria, vaginal discharge, or irregular bleeding  M:     NEGATIVE for significant arthralgias or myalgia  N:      NEGATIVE for weakness, dizziness or paresthesias  E:      NEGATIVE for temperature intolerance, skin/hair changes  P:      NEGATIVE for changes in mood or affect.    EXAM:  There were no vitals taken for this visit.   BMI: There is no height or weight on file to calculate BMI.  Constitutional: healthy, alert and no distress  Head: Normocephalic. No masses, lesions, tenderness or abnormalities  Neck: Neck supple. Trachea midline. No adenopathy. Thyroid symmetric, normal size.   Cardiovascular: RRR.   Respiratory: Negative.   Breast: {GYN Breast:941877}  Gastrointestinal: Abdomen soft, non-tender, non-distended. No masses, organomegaly.  :  Vulva:  No external lesions, normal female hair distribution, no inguinal adenopathy.    Urethra:  Midline, non-tender, well supported, no discharge  Vagina:  Moist, pink, no abnormal discharge, no lesions  Uterus:  Normal size, *** , non-tender, freely mobile  Ovaries:  No masses appreciated, non-tender, mobile  Rectal Exam: {RECTAL EXAM:235106}  Musculoskeletal: extremities normal  Skin: no suspicious lesions or rashes  Psychiatric: Affect appropriate, cooperative,mentation appears normal.     COUNSELING:   {FEMALE COUNSELING MESSAGES:317211::\"Reviewed preventive health counseling, as reflected in patient instructions\"}   reports that she has quit smoking. Her smoking use included cigarettes. She uses smokeless tobacco.  {Tobacco Cessation -- Delete if patient is a non-smoker:683502}  There is no height or weight on file to calculate BMI.  {Obesity Action Plan -- Delete if BMA < 25:414202}  FRAX Risk Assessment    ASSESSMENT:  34 year old female with satisfactory annual exam  {DIAG PICKLIST:756935}    "

## 2025-05-05 NOTE — PROGRESS NOTES
SUBJECTIVE:    Is a pregnancy test required: No.  Was a consent obtained?  Yes    Subjective: Layne Tariq is a 34 year old  presents for IUD and desires replacement of her Mirena type IUD. She is due for a PAP, after having +HR HPV (not 16 or 18) on her PAP last year. Also desires GC/CT testing.    She requests removal of the IUD because the IUD effectiveness has     Patient has been given the opportunity to ask questions about all forms of birth control, including all options appropriate for Layne Tariq. Discussed that no method of birth control, except abstinence is 100% effective against pregnancy or sexually transmitted infection.     Layne Tariq understands she may have the IUD removed at any time. IUD should be removed by a health care provider and the current IUD will be removed today.    The entire removal and insertion procedure was reviewed with the patient, including care after placement.    Today's PHQ-2 Score:       2025    12:14 PM   PHQ-2 (  [employer])   Q1: Little interest or pleasure in doing things 1   Q2: Feeling down, depressed or hopeless 1   PHQ-2 Score 2    Q1: Little interest or pleasure in doing things Several days   Q2: Feeling down, depressed or hopeless Several days   PHQ-2 Score 2       Patient-reported         PROCEDURE:    A speculum exam was performed and the cervix was visualized. The IUD string was visualized. Using ring forceps, the string  was grasped and the IUD removed intact.    Under sterile technique, cervix was visualized with speculum and prepped with Betadine solution swab x 3. Tenaculum was placed for stability. The uterus was gently straightened and sounded to 8.5 cm. IUD prepared for placement, and IUD inserted according to 's instructions without difficulty or significant ressitance, and deployed at the fundus. The strings were visualized and trimmed to 2.0 cm from the external os. Tenaculum was removed  and hemostasis noted. Speculum removed.  Patient tolerated procedure well.    EBL: minimal    Complications: none      POST PROCEDURE:    Given 's handouts, including when to have IUD removed, list of danger s/sx, side effects and follow up recommended. Encouraged condom use for prevention of STD. Advised to call for any fever, for prolonged or severe pain or bleeding, abnormal vaginal dischage, or unable to palpate strings. She was advised to use pain medications (ibuprofen) as needed for mild to moderate pain. Advised to follow-up in clinic in 4-6 weeks for IUD string check if unable to find strings or as directed by provider.     (Z30.433) Encounter for removal and reinsertion of intrauterine contraceptive device  (primary encounter diagnosis)  Plan: levonorgestrel (MIRENA) 52 MG (20 mcg/day) IUD         1 each, INSERTION INTRAUTERINE DEVICE, REMOVE         INTRAUTERINE DEVICE    (Z01.419) Encounter for gynecological examination without abnormal finding    (B97.7) HPV (human papilloma virus) infection  Plan: HPV and Gynecologic Cytology Panel    (Z11.3) Screening examination for STI  Plan: Chlamydia trachomatis/Neisseria gonorrhoeae by         PCR - Clinic Collect      Andrea Velasquez CNM

## 2025-05-06 ENCOUNTER — MYC REFILL (OUTPATIENT)
Dept: INTERNAL MEDICINE | Facility: CLINIC | Age: 35
End: 2025-05-06
Payer: COMMERCIAL

## 2025-05-06 DIAGNOSIS — F90.2 ATTENTION DEFICIT HYPERACTIVITY DISORDER (ADHD), COMBINED TYPE: ICD-10-CM

## 2025-05-06 LAB
C TRACH DNA SPEC QL PROBE+SIG AMP: NEGATIVE
HPV HR 12 DNA CVX QL NAA+PROBE: NEGATIVE
HPV16 DNA CVX QL NAA+PROBE: NEGATIVE
HPV18 DNA CVX QL NAA+PROBE: NEGATIVE
HUMAN PAPILLOMA VIRUS FINAL DIAGNOSIS: NORMAL
N GONORRHOEA DNA SPEC QL NAA+PROBE: NEGATIVE
SPECIMEN TYPE: NORMAL

## 2025-05-06 RX ORDER — DEXTROAMPHETAMINE SACCHARATE, AMPHETAMINE ASPARTATE, DEXTROAMPHETAMINE SULFATE AND AMPHETAMINE SULFATE 2.5; 2.5; 2.5; 2.5 MG/1; MG/1; MG/1; MG/1
10 TABLET ORAL 2 TIMES DAILY
Qty: 60 TABLET | Refills: 0 | Status: SHIPPED | OUTPATIENT
Start: 2025-05-13

## 2025-05-08 LAB
BKR AP ASSOCIATED HPV REPORT: NORMAL
BKR LAB AP GYN ADEQUACY: NORMAL
BKR LAB AP GYN INTERPRETATION: NORMAL
BKR LAB AP PREVIOUS ABNL DX: NORMAL
BKR LAB AP PREVIOUS ABNORMAL: NORMAL
PATH REPORT.COMMENTS IMP SPEC: NORMAL
PATH REPORT.COMMENTS IMP SPEC: NORMAL
PATH REPORT.RELEVANT HX SPEC: NORMAL

## 2025-06-09 ENCOUNTER — MYC REFILL (OUTPATIENT)
Dept: INTERNAL MEDICINE | Facility: CLINIC | Age: 35
End: 2025-06-09
Payer: COMMERCIAL

## 2025-06-09 DIAGNOSIS — F90.2 ATTENTION DEFICIT HYPERACTIVITY DISORDER (ADHD), COMBINED TYPE: ICD-10-CM

## 2025-06-09 RX ORDER — DEXTROAMPHETAMINE SACCHARATE, AMPHETAMINE ASPARTATE, DEXTROAMPHETAMINE SULFATE AND AMPHETAMINE SULFATE 2.5; 2.5; 2.5; 2.5 MG/1; MG/1; MG/1; MG/1
10 TABLET ORAL 2 TIMES DAILY
Qty: 60 TABLET | Refills: 0 | Status: SHIPPED | OUTPATIENT
Start: 2025-06-11

## 2025-07-07 ENCOUNTER — MYC REFILL (OUTPATIENT)
Dept: INTERNAL MEDICINE | Facility: CLINIC | Age: 35
End: 2025-07-07
Payer: COMMERCIAL

## 2025-07-07 DIAGNOSIS — F90.2 ATTENTION DEFICIT HYPERACTIVITY DISORDER (ADHD), COMBINED TYPE: ICD-10-CM

## 2025-07-08 RX ORDER — DEXTROAMPHETAMINE SACCHARATE, AMPHETAMINE ASPARTATE, DEXTROAMPHETAMINE SULFATE AND AMPHETAMINE SULFATE 2.5; 2.5; 2.5; 2.5 MG/1; MG/1; MG/1; MG/1
10 TABLET ORAL 2 TIMES DAILY
Qty: 60 TABLET | Refills: 0 | Status: SHIPPED | OUTPATIENT
Start: 2025-07-10

## 2025-07-08 NOTE — CONFIDENTIAL NOTE
PDMP reviewed    Adderall prescription last filled June 14, prior the 16th, April 16    Takes 10 mg twice daily.  60 tablets    Would therefore be due on or about July 14.  Can wait for PCP return tomorrow

## 2025-08-11 ENCOUNTER — MYC REFILL (OUTPATIENT)
Dept: INTERNAL MEDICINE | Facility: CLINIC | Age: 35
End: 2025-08-11
Payer: COMMERCIAL

## 2025-08-11 DIAGNOSIS — F90.2 ATTENTION DEFICIT HYPERACTIVITY DISORDER (ADHD), COMBINED TYPE: ICD-10-CM

## 2025-08-11 RX ORDER — DEXTROAMPHETAMINE SACCHARATE, AMPHETAMINE ASPARTATE, DEXTROAMPHETAMINE SULFATE AND AMPHETAMINE SULFATE 2.5; 2.5; 2.5; 2.5 MG/1; MG/1; MG/1; MG/1
10 TABLET ORAL 2 TIMES DAILY
Qty: 60 TABLET | Refills: 0 | Status: SHIPPED | OUTPATIENT
Start: 2025-08-11